# Patient Record
Sex: FEMALE | Race: BLACK OR AFRICAN AMERICAN | Employment: FULL TIME | URBAN - METROPOLITAN AREA
[De-identification: names, ages, dates, MRNs, and addresses within clinical notes are randomized per-mention and may not be internally consistent; named-entity substitution may affect disease eponyms.]

---

## 2018-01-04 ENCOUNTER — HOSPITAL ENCOUNTER (EMERGENCY)
Age: 18
Discharge: HOME | End: 2018-01-05
Attending: EMERGENCY MEDICINE
Payer: SELF-PAY

## 2018-01-04 VITALS
HEIGHT: 63 IN | OXYGEN SATURATION: 100 % | SYSTOLIC BLOOD PRESSURE: 122 MMHG | WEIGHT: 234 LBS | HEART RATE: 58 BPM | TEMPERATURE: 98.4 F | RESPIRATION RATE: 20 BRPM | DIASTOLIC BLOOD PRESSURE: 49 MMHG | BODY MASS INDEX: 41.46 KG/M2

## 2018-01-04 DIAGNOSIS — Z34.91 NORMAL INTRAUTERINE PREGNANCY ON PRENATAL ULTRASOUND IN FIRST TRIMESTER: Primary | ICD-10-CM

## 2018-01-04 LAB
ALBUMIN SERPL-MCNC: 3.7 G/DL (ref 3.5–5)
ALBUMIN/GLOB SERPL: 1 {RATIO} (ref 1.1–2.2)
ALP SERPL-CCNC: 85 U/L (ref 45–117)
ALT SERPL-CCNC: 18 U/L (ref 12–78)
ANION GAP SERPL CALC-SCNC: 10 MMOL/L (ref 5–15)
APPEARANCE UR: CLEAR
AST SERPL-CCNC: 14 U/L (ref 15–37)
BASOPHILS # BLD: 0 K/UL (ref 0–0.1)
BASOPHILS NFR BLD: 0 % (ref 0–1)
BILIRUB SERPL-MCNC: 0.3 MG/DL (ref 0.2–1)
BILIRUB UR QL: NEGATIVE
BUN SERPL-MCNC: 8 MG/DL (ref 6–20)
BUN/CREAT SERPL: 11 (ref 12–20)
CALCIUM SERPL-MCNC: 9.2 MG/DL (ref 8.5–10.1)
CHLORIDE SERPL-SCNC: 103 MMOL/L (ref 97–108)
CO2 SERPL-SCNC: 24 MMOL/L (ref 21–32)
COLOR UR: ABNORMAL
CREAT SERPL-MCNC: 0.73 MG/DL (ref 0.55–1.02)
D DIMER PPP FEU-MCNC: 0.24 MG/L FEU (ref 0–0.65)
EOSINOPHIL # BLD: 0.2 K/UL (ref 0–0.4)
EOSINOPHIL NFR BLD: 1 % (ref 0–7)
ERYTHROCYTE [DISTWIDTH] IN BLOOD BY AUTOMATED COUNT: 12.1 % (ref 11.5–14.5)
GLOBULIN SER CALC-MCNC: 3.7 G/DL (ref 2–4)
GLUCOSE SERPL-MCNC: 93 MG/DL (ref 65–100)
GLUCOSE UR STRIP.AUTO-MCNC: NEGATIVE MG/DL
HCG SERPL-ACNC: ABNORMAL MIU/ML (ref 0–6)
HCT VFR BLD AUTO: 37.8 % (ref 35–47)
HGB BLD-MCNC: 13.1 G/DL (ref 11.5–16)
HGB UR QL STRIP: NEGATIVE
KETONES UR QL STRIP.AUTO: ABNORMAL MG/DL
LEUKOCYTE ESTERASE UR QL STRIP.AUTO: NEGATIVE
LYMPHOCYTES # BLD: 2.7 K/UL (ref 0.8–3.5)
LYMPHOCYTES NFR BLD: 19 % (ref 12–49)
MCH RBC QN AUTO: 29.4 PG (ref 26–34)
MCHC RBC AUTO-ENTMCNC: 34.7 G/DL (ref 30–36.5)
MCV RBC AUTO: 84.9 FL (ref 80–99)
MONOCYTES # BLD: 1 K/UL (ref 0–1)
MONOCYTES NFR BLD: 8 % (ref 5–13)
NEUTS SEG # BLD: 9.8 K/UL (ref 1.8–8)
NEUTS SEG NFR BLD: 72 % (ref 32–75)
NITRITE UR QL STRIP.AUTO: NEGATIVE
PH UR STRIP: 7 [PH] (ref 5–8)
PLATELET # BLD AUTO: 284 K/UL (ref 150–400)
POTASSIUM SERPL-SCNC: 3.7 MMOL/L (ref 3.5–5.1)
PROT SERPL-MCNC: 7.4 G/DL (ref 6.4–8.2)
PROT UR STRIP-MCNC: NEGATIVE MG/DL
RBC # BLD AUTO: 4.45 M/UL (ref 3.8–5.2)
SODIUM SERPL-SCNC: 137 MMOL/L (ref 136–145)
SP GR UR REFRACTOMETRY: 1.02 (ref 1–1.03)
TROPONIN I SERPL-MCNC: <0.04 NG/ML
UROBILINOGEN UR QL STRIP.AUTO: 1 EU/DL (ref 0.2–1)
WBC # BLD AUTO: 13.7 K/UL (ref 3.6–11)

## 2018-01-04 PROCEDURE — 84484 ASSAY OF TROPONIN QUANT: CPT | Performed by: EMERGENCY MEDICINE

## 2018-01-04 PROCEDURE — 80053 COMPREHEN METABOLIC PANEL: CPT | Performed by: EMERGENCY MEDICINE

## 2018-01-04 PROCEDURE — 84702 CHORIONIC GONADOTROPIN TEST: CPT | Performed by: EMERGENCY MEDICINE

## 2018-01-04 PROCEDURE — 85025 COMPLETE CBC W/AUTO DIFF WBC: CPT | Performed by: EMERGENCY MEDICINE

## 2018-01-04 PROCEDURE — 93005 ELECTROCARDIOGRAM TRACING: CPT

## 2018-01-04 PROCEDURE — 36415 COLL VENOUS BLD VENIPUNCTURE: CPT | Performed by: EMERGENCY MEDICINE

## 2018-01-04 PROCEDURE — 85379 FIBRIN DEGRADATION QUANT: CPT | Performed by: EMERGENCY MEDICINE

## 2018-01-04 PROCEDURE — 99284 EMERGENCY DEPT VISIT MOD MDM: CPT

## 2018-01-04 PROCEDURE — 81003 URINALYSIS AUTO W/O SCOPE: CPT | Performed by: EMERGENCY MEDICINE

## 2018-01-05 ENCOUNTER — APPOINTMENT (OUTPATIENT)
Dept: ULTRASOUND IMAGING | Age: 18
End: 2018-01-05
Attending: EMERGENCY MEDICINE
Payer: SELF-PAY

## 2018-01-05 PROCEDURE — 76817 TRANSVAGINAL US OBSTETRIC: CPT

## 2018-01-05 PROCEDURE — 76801 OB US < 14 WKS SINGLE FETUS: CPT

## 2018-01-05 NOTE — ED PROVIDER NOTES
EMERGENCY DEPARTMENT HISTORY AND PHYSICAL EXAM      Date: 1/4/2018  Patient Name: Callie Campbell    History of Presenting Illness     Chief Complaint   Patient presents with    Vaginal Bleeding     Pt with c/o vaginal bleeding that started today, pt is 7 weeks pregnant. Pt also has c/o intermittent CP for the past two days with worsening pain today.  Chest Pain       History Provided By: Patient    HPI: Callie Campbell, 21 y.o. seven week pregnant female presents ambulatory to the ED with cc of vaginal bleeding and associated moderate vaginal/adbominal cramping onset today. She states she was confirmed pregnant by her PCP and is expected to f/u at 10 weeks. Pt also reports having increasing intermittent moderate to severe CP x three days. She denies any medical evaluation for the CP. Pt denies any hx of similar CP or cardiac disease. She notes quitting smoking after finding out she was pregnant. Pt specifically denies any fevers, chills, sore throat, rhinorrhea, SOB, nausea, vomiting, HA, dizziness, and rashes. PMHx: None pertinent to HPI. Social hx: -(former)Tobacco, -EtOH, -Drugs    PCP: Edgar Kamara MD    There are no other complaints, changes, or physical findings at this time. Past History     Past Medical History:  Past Medical History:   Diagnosis Date    Other ill-defined conditions(782.44)     eczema, ADHD    Psychiatric disorder        Past Surgical History:  History reviewed. No pertinent surgical history. Family History:  History reviewed. No pertinent family history. Social History:  Social History   Substance Use Topics    Smoking status: Never Smoker    Smokeless tobacco: None    Alcohol use No       Allergies:  No Known Allergies      Review of Systems   Review of Systems   Constitutional: Negative for chills and fever. HENT: Negative for congestion, rhinorrhea, sneezing and sore throat. Eyes: Negative for redness and visual disturbance. Respiratory: Positive for chest tightness. Negative for shortness of breath. Cardiovascular: Positive for chest pain. Negative for leg swelling. Gastrointestinal: Positive for abdominal pain. Negative for nausea and vomiting. Genitourinary: Positive for vaginal bleeding and vaginal pain. Negative for difficulty urinating and frequency. Musculoskeletal: Negative for back pain, myalgias and neck pain. Skin: Negative for rash. Neurological: Negative for dizziness, syncope, weakness and headaches. Hematological: Negative for adenopathy. All other systems reviewed and are negative. Physical Exam   Physical Exam   Constitutional: She is oriented to person, place, and time. She appears well-developed and well-nourished. HENT:   Head: Normocephalic. Mouth/Throat: Oropharynx is clear and moist.   Eyes: Conjunctivae and EOM are normal. Pupils are equal, round, and reactive to light. Neck: Normal range of motion. Neck supple. Cardiovascular: Normal rate, regular rhythm, normal heart sounds and intact distal pulses. Pulmonary/Chest: Effort normal and breath sounds normal.   Abdominal: Soft. Bowel sounds are normal. She exhibits no distension. There is no rebound. Genitourinary:   Genitourinary Comments: Cervical os closed. No vaginal discharge. No lesions. Musculoskeletal: Normal range of motion. She exhibits no edema or deformity. Neurological: She is alert and oriented to person, place, and time. Skin: Skin is warm and dry. Psychiatric: She has a normal mood and affect.  Her behavior is normal. Judgment and thought content normal.         Diagnostic Study Results     Labs -     Recent Results (from the past 12 hour(s))   TROPONIN I    Collection Time: 01/04/18  9:47 PM   Result Value Ref Range    Troponin-I, Qt. <0.04 <0.05 ng/mL   CBC WITH AUTOMATED DIFF    Collection Time: 01/04/18  9:54 PM   Result Value Ref Range    WBC 13.7 (H) 3.6 - 11.0 K/uL    RBC 4.45 3.80 - 5.20 M/uL HGB 13.1 11.5 - 16.0 g/dL    HCT 37.8 35.0 - 47.0 %    MCV 84.9 80.0 - 99.0 FL    MCH 29.4 26.0 - 34.0 PG    MCHC 34.7 30.0 - 36.5 g/dL    RDW 12.1 11.5 - 14.5 %    PLATELET 703 266 - 540 K/uL    NEUTROPHILS 72 32 - 75 %    LYMPHOCYTES 19 12 - 49 %    MONOCYTES 8 5 - 13 %    EOSINOPHILS 1 0 - 7 %    BASOPHILS 0 0 - 1 %    ABS. NEUTROPHILS 9.8 (H) 1.8 - 8.0 K/UL    ABS. LYMPHOCYTES 2.7 0.8 - 3.5 K/UL    ABS. MONOCYTES 1.0 0.0 - 1.0 K/UL    ABS. EOSINOPHILS 0.2 0.0 - 0.4 K/UL    ABS. BASOPHILS 0.0 0.0 - 0.1 K/UL   D DIMER    Collection Time: 01/04/18  9:54 PM   Result Value Ref Range    D-dimer 0.24 0.00 - 0.65 mg/L FEU   METABOLIC PANEL, COMPREHENSIVE    Collection Time: 01/04/18  9:54 PM   Result Value Ref Range    Sodium 137 136 - 145 mmol/L    Potassium 3.7 3.5 - 5.1 mmol/L    Chloride 103 97 - 108 mmol/L    CO2 24 21 - 32 mmol/L    Anion gap 10 5 - 15 mmol/L    Glucose 93 65 - 100 mg/dL    BUN 8 6 - 20 MG/DL    Creatinine 0.73 0.55 - 1.02 MG/DL    BUN/Creatinine ratio 11 (L) 12 - 20      GFR est AA >60 >60 ml/min/1.73m2    GFR est non-AA >60 >60 ml/min/1.73m2    Calcium 9.2 8.5 - 10.1 MG/DL    Bilirubin, total 0.3 0.2 - 1.0 MG/DL    ALT (SGPT) 18 12 - 78 U/L    AST (SGOT) 14 (L) 15 - 37 U/L    Alk.  phosphatase 85 45 - 117 U/L    Protein, total 7.4 6.4 - 8.2 g/dL    Albumin 3.7 3.5 - 5.0 g/dL    Globulin 3.7 2.0 - 4.0 g/dL    A-G Ratio 1.0 (L) 1.1 - 2.2     BETA HCG, QT    Collection Time: 01/04/18  9:54 PM   Result Value Ref [place] [address] (H) 0 - 6 MIU/ML   URINALYSIS W/ RFLX MICROSCOPIC    Collection Time: 01/04/18  9:54 PM   Result Value Ref Range    Color YELLOW/STRAW      Appearance CLEAR CLEAR      Specific gravity 1.020 1.003 - 1.030      pH (UA) 7.0 5.0 - 8.0      Protein NEGATIVE  NEG mg/dL    Glucose NEGATIVE  NEG mg/dL    Ketone TRACE (A) NEG mg/dL    Bilirubin NEGATIVE  NEG      Blood NEGATIVE  NEG      Urobilinogen 1.0 0.2 - 1.0 EU/dL    Nitrites NEGATIVE  NEG      Leukocyte Esterase NEGATIVE  NEG     EKG, 12 LEAD, INITIAL    Collection Time: 01/04/18 10:09 PM   Result Value Ref Range    Ventricular Rate 59 BPM    Atrial Rate 59 BPM    P-R Interval 158 ms    QRS Duration 96 ms    Q-T Interval 400 ms    QTC Calculation (Bezet) 396 ms    Calculated P Axis 42 degrees    Calculated R Axis 79 degrees    Calculated T Axis 43 degrees    Diagnosis       Sinus bradycardia with sinus arrhythmia  Otherwise normal ECG  No previous ECGs available         Radiologic Studies -   Study Result      INDICATION:  Vaginal bleeding, after first trimester, no other signs/symp      OBSTETRIC PELVIC SONOGRAM  is performed through the unremarkable urinary  bladder.     The uterus contains a single early fetal pole with heart rate 121 bpm. Uterus  measures approximately 9 x 6 x 5 cm, with no apparent fibroid. The placenta and  amniotic fluid volume cannot be evaluated due to early gestational age. Gestational sac is smooth.         Ovaries are obscured by bowel gas. No adnexal mass or dominant cyst is seen. There is no significant free fluid in the pelvic cul-de-sac.     OBSTETRIC TRANSVAGINAL SONOGRAM  is performed following Pelvic Sonogram  in  order to more accurately measure the early IUP and to more adequately delineate  ovarian anatomy.     Crown-rump length measures 7 mm corresponding with 6 week 4 day age.      Left ovary remains obscured. Right ovary is normal in appearance and  vascularity, measuring 2.9 x 2.8 x 2.5 cm.     IMPRESSION  IMPRESSION: 6 week 4 day living IUP. Unremarkable adnexa. No free fluid. Medical Decision Making   I am the first provider for this patient. I reviewed the vital signs, available nursing notes, past medical history, past surgical history, family history and social history. Vital Signs-Reviewed the patient's vital signs.   Patient Vitals for the past 12 hrs:   Temp Pulse Resp BP SpO2   01/04/18 2145 98.4 °F (36.9 °C) (!) 58 20 122/49 100 %       Records Reviewed: Old Medical Records    Provider Notes (Medical Decision Making):   DDx: arrhythmia, PE, threatened miscarriage, UTI    ED Course:   Initial assessment performed. The patients presenting problems have been discussed, and they are in agreement with the care plan formulated and outlined with them. I have encouraged them to ask questions as they arise throughout their visit. Procedure Note - Pelvic Exam:    11:23 PM  Performed by: Cr Yoder MD  Chaperoned by: Librado Hardy RN  Pelvic exam was performed using bimanual and speculum. Further findings noted in physical exam.   The procedure took 1-15 minutes, and pt tolerated well. SIGN OUT:  12:05 AM  Patient's presentation, labs/imaging and plan of care was reviewed with Tara Irwin MD as part of sign out. They will review pt's US results, treat and dispo accordingly as part of the plan discussed with the patient. Tara Irwin MD's assistance in completion of this plan is greatly appreciated but it should be noted that I will be the provider of record for this patient. Cr Yoder MD    Critical Care Time:   None    Disposition:  DISCHARGE NOTE  1:52 AM  The patient has been re-evaluated and is ready for discharge. Reviewed available results with patient. Counseled pt on diagnosis and care plan. Pt has expressed understanding, and all questions have been answered. Pt agrees with plan and agrees to follow up as recommended, or return to the ED if their symptoms worsen. Discharge instructions have been provided and explained to the pt, along with reasons to return to the ED. PLAN:  1. There are no discharge medications for this patient.     2.   Follow-up Information     Follow up With Details Comments Contact Info    [facility] EMERGENCY DEPT  As needed, If symptoms worsen [address] [facility]    Alyssa Camp MD [facility] Address: [address] [facility], [address]  Phone: [phone] [address]  [phone]    [facility]   [address]   [place], [address] [address]  [address] [facility]   [address]  [address] [address] [phone]    Verito Ferrera, [address]  [phone]          Return to ED if worse     Diagnosis     Clinical Impression:   1. Normal intrauterine pregnancy on prenatal ultrasound in first trimester        Attestations: This note is prepared by Michael Wright, acting as Scribe for Audrey George MD.    Audrey George MD: The scribe's documentation has been prepared under my direction and personally reviewed by me in its entirety. I confirm that the note above accurately reflects all work, treatment, procedures, and medical decision making performed by me.

## 2018-01-05 NOTE — ED NOTES
Pt noted to be resting comfortably, given water to drink. Pt updated on plan of care, has no questions or concerns at this time.

## 2018-01-05 NOTE — DISCHARGE INSTRUCTIONS
Threatened Miscarriage: Care Instructions  Your Care Instructions    Some women have light spotting or bleeding during the first 12 weeks of pregnancy. In some cases this is normal. Light spotting or bleeding can also be a sign of a possible loss of the pregnancy. This is called a threatened miscarriage. At this point, the doctor may not be able to tell if your vaginal bleeding is normal or is a sign of a miscarriage. In early pregnancy, things such as stress, exercise, and sex do not cause miscarriage. You may be worried or upset about the possibility of losing your pregnancy. But do not blame yourself. There is no treatment to stop a threatened miscarriage. If you do have a miscarriage, there was nothing you could have done to prevent it. A miscarriage usually means that the pregnancy is not developing normally. The doctor has checked you carefully, but problems can develop later. If you notice any problems or new symptoms, get medical treatment right away. Follow-up care is a key part of your treatment and safety. Be sure to make and go to all appointments, and call your doctor if you are having problems. It's also a good idea to know your test results and keep a list of the medicines you take. How can you care for yourself at home? · If you do have a miscarriage, you will probably have some vaginal bleeding for 1 to 2 weeks. Use pads instead of tampons. · Take acetaminophen (Tylenol) for cramps. Read and follow all instructions on the label. · Do not take two or more pain medicines at the same time unless the doctor told you to. Many pain medicines have acetaminophen, which is Tylenol. Too much acetaminophen (Tylenol) can be harmful. · Do not have sex until your doctor says it is okay. · Get lots of rest over the next several days. · You may do your normal activities if you feel well enough to do them. But do not do any heavy exercise until your doctor says it is okay.   · Eat a balanced diet that is high in iron and vitamin C. Foods rich in iron include red meat, shellfish, eggs, beans, and leafy green vegetables. Foods high in vitamin C include citrus fruits, tomatoes, and broccoli. Talk to your doctor about whether you need to take iron pills or a multivitamin. · Do not drink alcohol or use tobacco or illegal drugs. · Do not smoke. If you need help quitting, talk to your doctor about stop-smoking programs and medicines. These can increase your chances of quitting for good. When should you call for help? Call 911 anytime you think you may need emergency care. For example, call if:  ? · You passed out (lost consciousness). ?Call your doctor now or seek immediate medical care if:  ? · You have severe vaginal bleeding. ? · You are dizzy or lightheaded, or you feel like you may faint. ? · You have new or worse pain in your belly or pelvis. ? · You have a fever. ? · You have vaginal discharge that smells bad. ? Watch closely for changes in your health, and be sure to contact your doctor if:  ? · You do not get better as expected. Where can you learn more? Go to http://jonathan-lara.info/. Enter P736 in the search box to learn more about \"Threatened Miscarriage: Care Instructions. \"  Current as of: March 16, 2017  Content Version: 11.4  © 5600-3529 [employer]. Care instructions adapted under license by [employer] (which disclaims liability or warranty for this information). If you have questions about a medical condition or this instruction, always ask your healthcare professional. [place] [address] any warranty or liability for your use of this information.

## 2018-01-05 NOTE — ED NOTES
Pt presents ambulatory to ED complaining of vaginal bleeding that started today. Pt reports she is 7 weeks pregnant, states she has not yet set up any prenatal care because she no longer has insurance. Pt given handouts for prenatal classes offered at [facility]. Pt also reports pain to the center of her chest x 2 days, states she has not tried any medication for this. Pt is alert and oriented x 4, RR even and unlabored, skin is warm and dry. Assesment completed and pt updated on plan of care. Emergency Department Nursing Plan of Care       The Nursing Plan of Care is developed from the Nursing assessment and Emergency Department Attending provider initial evaluation. The plan of care may be reviewed in the ED Provider note.     The Plan of Care was developed with the following considerations:   Patient / Family readiness to learn indicated by:verbalized understanding  Persons(s) to be included in education: patient  Barriers to Learning/Limitations:No    Signed     Ronda Arteaga RN    1/4/2018   10:15 PM

## 2018-01-05 NOTE — ED NOTES
Pt noted to be resting comfortably with male visitor at bedside. Pt updated on plan of care, has no questions or concerns at this time.

## 2018-01-05 NOTE — ED NOTES
Discharge instructions were given to the patient by Violet Siu RN. The patient left the Emergency Department ambulatory, alert and oriented and in no acute distress with 0 prescriptions. The patient was encouraged to call or return to the ED for worsening issues or problems and was encouraged to schedule a follow up appointment for continuing care. The patient verbalized understanding of discharge instructions and prescriptions, all questions were answered. The patient has no further concerns at this time.

## 2018-01-06 LAB
ATRIAL RATE: 59 BPM
CALCULATED P AXIS, ECG09: 42 DEGREES
CALCULATED R AXIS, ECG10: 79 DEGREES
CALCULATED T AXIS, ECG11: 43 DEGREES
DIAGNOSIS, 93000: NORMAL
P-R INTERVAL, ECG05: 158 MS
Q-T INTERVAL, ECG07: 400 MS
QRS DURATION, ECG06: 96 MS
QTC CALCULATION (BEZET), ECG08: 396 MS
VENTRICULAR RATE, ECG03: 59 BPM

## 2018-03-22 LAB
ANTIBODY SCREEN, EXTERNAL: NEGATIVE
CHLAMYDIA, EXTERNAL: NEGATIVE
HBSAG, EXTERNAL: NEGATIVE
HIV, EXTERNAL: NEGATIVE
N. GONORRHEA, EXTERNAL: NEGATIVE
RPR, EXTERNAL: NON REACTIVE
RUBELLA, EXTERNAL: NORMAL
TYPE, ABO & RH, EXTERNAL: NORMAL

## 2018-04-26 ENCOUNTER — HOSPITAL ENCOUNTER (OUTPATIENT)
Dept: PERINATAL CARE | Age: 18
Discharge: HOME | End: 2018-04-26
Attending: OBSTETRICS & GYNECOLOGY
Payer: MEDICAID

## 2018-04-26 PROCEDURE — 76811 OB US DETAILED SNGL FETUS: CPT | Performed by: OBSTETRICS & GYNECOLOGY

## 2018-08-03 LAB — GRBS, EXTERNAL: POSITIVE

## 2018-08-30 ENCOUNTER — ANESTHESIA EVENT (OUTPATIENT)
Dept: LABOR AND DELIVERY | Age: 18
DRG: 540 | End: 2018-08-30
Payer: MEDICAID

## 2018-08-30 ENCOUNTER — ANESTHESIA (OUTPATIENT)
Dept: LABOR AND DELIVERY | Age: 18
DRG: 540 | End: 2018-08-30
Payer: MEDICAID

## 2018-08-30 ENCOUNTER — HOSPITAL ENCOUNTER (INPATIENT)
Age: 18
LOS: 3 days | Discharge: HOME | DRG: 540 | End: 2018-09-02
Attending: OBSTETRICS & GYNECOLOGY | Admitting: OBSTETRICS & GYNECOLOGY
Payer: MEDICAID

## 2018-08-30 PROBLEM — O26.893 ABDOMINAL PAIN DURING PREGNANCY, THIRD TRIMESTER: Status: ACTIVE | Noted: 2018-08-30

## 2018-08-30 PROBLEM — R10.9 ABDOMINAL PAIN DURING PREGNANCY, THIRD TRIMESTER: Status: ACTIVE | Noted: 2018-08-30

## 2018-08-30 LAB
ABO + RH BLD: NORMAL
BLOOD GROUP ANTIBODIES SERPL: NORMAL
ERYTHROCYTE [DISTWIDTH] IN BLOOD BY AUTOMATED COUNT: 12.6 % (ref 11.5–14.5)
HCT VFR BLD AUTO: 34.2 % (ref 35–47)
HGB BLD-MCNC: 11.3 G/DL (ref 11.5–16)
MCH RBC QN AUTO: 29.4 PG (ref 26–34)
MCHC RBC AUTO-ENTMCNC: 33 G/DL (ref 30–36.5)
MCV RBC AUTO: 88.8 FL (ref 80–99)
NRBC # BLD: 0 K/UL (ref 0–0.01)
NRBC BLD-RTO: 0 PER 100 WBC
PLATELET # BLD AUTO: 217 K/UL (ref 150–400)
PMV BLD AUTO: 11.1 FL (ref 8.9–12.9)
RBC # BLD AUTO: 3.85 M/UL (ref 3.8–5.2)
SPECIMEN EXP DATE BLD: NORMAL
WBC # BLD AUTO: 13.7 K/UL (ref 3.6–11)

## 2018-08-30 PROCEDURE — 74011000250 HC RX REV CODE- 250: Performed by: ANESTHESIOLOGY

## 2018-08-30 PROCEDURE — 65270000029 HC RM PRIVATE

## 2018-08-30 PROCEDURE — 74011250636 HC RX REV CODE- 250/636: Performed by: ANESTHESIOLOGY

## 2018-08-30 PROCEDURE — 76010000391 HC C SECN FIRST 1 HR: Performed by: OBSTETRICS & GYNECOLOGY

## 2018-08-30 PROCEDURE — 74011250636 HC RX REV CODE- 250/636: Performed by: OBSTETRICS & GYNECOLOGY

## 2018-08-30 PROCEDURE — 74011000258 HC RX REV CODE- 258: Performed by: OBSTETRICS & GYNECOLOGY

## 2018-08-30 PROCEDURE — 77030011943

## 2018-08-30 PROCEDURE — 77030018836 HC SOL IRR NACL ICUM -A

## 2018-08-30 PROCEDURE — A4300 CATH IMPL VASC ACCESS PORTAL: HCPCS

## 2018-08-30 PROCEDURE — 86900 BLOOD TYPING SEROLOGIC ABO: CPT | Performed by: OBSTETRICS & GYNECOLOGY

## 2018-08-30 PROCEDURE — 77030011220 HC DEV ELECSURG COVD -B

## 2018-08-30 PROCEDURE — 74011250636 HC RX REV CODE- 250/636

## 2018-08-30 PROCEDURE — 77030032060 HC PWDR HEMSTAT ARISTA ASRB 3GM BARD -C

## 2018-08-30 PROCEDURE — 77030018846 HC SOL IRR STRL H20 ICUM -A

## 2018-08-30 PROCEDURE — 74011000250 HC RX REV CODE- 250

## 2018-08-30 PROCEDURE — 75410000003 HC RECOV DEL/VAG/CSECN EA 0.5 HR: Performed by: OBSTETRICS & GYNECOLOGY

## 2018-08-30 PROCEDURE — 36415 COLL VENOUS BLD VENIPUNCTURE: CPT | Performed by: OBSTETRICS & GYNECOLOGY

## 2018-08-30 PROCEDURE — 75410000002 HC LABOR FEE PER 1 HR

## 2018-08-30 PROCEDURE — 99285 EMERGENCY DEPT VISIT HI MDM: CPT

## 2018-08-30 PROCEDURE — 77030032490 HC SLV COMPR SCD KNE COVD -B

## 2018-08-30 PROCEDURE — 85027 COMPLETE CBC AUTOMATED: CPT | Performed by: OBSTETRICS & GYNECOLOGY

## 2018-08-30 PROCEDURE — 77030012935 HC DRSG AQUACEL BMS -B

## 2018-08-30 PROCEDURE — 76060000078 HC EPIDURAL ANESTHESIA: Performed by: OBSTETRICS & GYNECOLOGY

## 2018-08-30 RX ORDER — KETOROLAC TROMETHAMINE 30 MG/ML
30 INJECTION, SOLUTION INTRAMUSCULAR; INTRAVENOUS
Status: DISPENSED | OUTPATIENT
Start: 2018-08-30 | End: 2018-08-31

## 2018-08-30 RX ORDER — MORPHINE SULFATE 10 MG/ML
10 INJECTION, SOLUTION INTRAMUSCULAR; INTRAVENOUS
Status: DISCONTINUED | OUTPATIENT
Start: 2018-08-30 | End: 2018-09-02 | Stop reason: HOSPADM

## 2018-08-30 RX ORDER — SIMETHICONE 80 MG
80 TABLET,CHEWABLE ORAL
Status: DISCONTINUED | OUTPATIENT
Start: 2018-08-30 | End: 2018-09-02 | Stop reason: HOSPADM

## 2018-08-30 RX ORDER — OXYTOCIN IN 5 % DEXTROSE 30/500 ML
1-25 PLASTIC BAG, INJECTION (ML) INTRAVENOUS
Status: DISCONTINUED | OUTPATIENT
Start: 2018-08-30 | End: 2018-09-02 | Stop reason: HOSPADM

## 2018-08-30 RX ORDER — NALBUPHINE HYDROCHLORIDE 10 MG/ML
5 INJECTION, SOLUTION INTRAMUSCULAR; INTRAVENOUS; SUBCUTANEOUS
Status: DISCONTINUED | OUTPATIENT
Start: 2018-08-30 | End: 2018-09-02 | Stop reason: HOSPADM

## 2018-08-30 RX ORDER — SODIUM CHLORIDE, SODIUM LACTATE, POTASSIUM CHLORIDE, CALCIUM CHLORIDE 600; 310; 30; 20 MG/100ML; MG/100ML; MG/100ML; MG/100ML
1000 INJECTION, SOLUTION INTRAVENOUS CONTINUOUS
Status: DISCONTINUED | OUTPATIENT
Start: 2018-08-30 | End: 2018-08-30 | Stop reason: HOSPADM

## 2018-08-30 RX ORDER — MORPHINE SULFATE 0.5 MG/ML
INJECTION, SOLUTION EPIDURAL; INTRATHECAL; INTRAVENOUS AS NEEDED
Status: DISCONTINUED | OUTPATIENT
Start: 2018-08-30 | End: 2018-08-30 | Stop reason: HOSPADM

## 2018-08-30 RX ORDER — SODIUM CHLORIDE 0.9 % (FLUSH) 0.9 %
5-10 SYRINGE (ML) INJECTION EVERY 8 HOURS
Status: DISCONTINUED | OUTPATIENT
Start: 2018-08-30 | End: 2018-09-02 | Stop reason: HOSPADM

## 2018-08-30 RX ORDER — SODIUM CHLORIDE, SODIUM LACTATE, POTASSIUM CHLORIDE, CALCIUM CHLORIDE 600; 310; 30; 20 MG/100ML; MG/100ML; MG/100ML; MG/100ML
125 INJECTION, SOLUTION INTRAVENOUS CONTINUOUS
Status: DISCONTINUED | OUTPATIENT
Start: 2018-08-30 | End: 2018-09-02 | Stop reason: HOSPADM

## 2018-08-30 RX ORDER — FENTANYL/BUPIVACAINE/NS/PF 2-1250MCG
1-16 PREFILLED PUMP RESERVOIR EPIDURAL CONTINUOUS
Status: DISCONTINUED | OUTPATIENT
Start: 2018-08-30 | End: 2018-08-30 | Stop reason: HOSPADM

## 2018-08-30 RX ORDER — NALOXONE HYDROCHLORIDE 0.4 MG/ML
0.4 INJECTION, SOLUTION INTRAMUSCULAR; INTRAVENOUS; SUBCUTANEOUS AS NEEDED
Status: DISCONTINUED | OUTPATIENT
Start: 2018-08-30 | End: 2018-09-02 | Stop reason: HOSPADM

## 2018-08-30 RX ORDER — CEFAZOLIN SODIUM/WATER 2 G/20 ML
2 SYRINGE (ML) INTRAVENOUS ONCE
Status: COMPLETED | OUTPATIENT
Start: 2018-08-30 | End: 2018-08-30

## 2018-08-30 RX ORDER — TERBUTALINE SULFATE 1 MG/ML
0.25 INJECTION SUBCUTANEOUS AS NEEDED
Status: DISCONTINUED | OUTPATIENT
Start: 2018-08-30 | End: 2018-08-30 | Stop reason: HOSPADM

## 2018-08-30 RX ORDER — IBUPROFEN 400 MG/1
800 TABLET ORAL EVERY 8 HOURS
Status: DISCONTINUED | OUTPATIENT
Start: 2018-08-30 | End: 2018-09-02 | Stop reason: HOSPADM

## 2018-08-30 RX ORDER — BUPIVACAINE HYDROCHLORIDE 2.5 MG/ML
INJECTION, SOLUTION EPIDURAL; INFILTRATION; INTRACAUDAL
Status: DISPENSED
Start: 2018-08-30 | End: 2018-08-31

## 2018-08-30 RX ORDER — HYDROCORTISONE 1 %
CREAM (GRAM) TOPICAL AS NEEDED
Status: DISCONTINUED | OUTPATIENT
Start: 2018-08-30 | End: 2018-09-02 | Stop reason: HOSPADM

## 2018-08-30 RX ORDER — ONDANSETRON 2 MG/ML
INJECTION INTRAMUSCULAR; INTRAVENOUS AS NEEDED
Status: DISCONTINUED | OUTPATIENT
Start: 2018-08-30 | End: 2018-08-30 | Stop reason: HOSPADM

## 2018-08-30 RX ORDER — ONDANSETRON 2 MG/ML
4 INJECTION INTRAMUSCULAR; INTRAVENOUS
Status: ACTIVE | OUTPATIENT
Start: 2018-08-30 | End: 2018-08-31

## 2018-08-30 RX ORDER — LIDOCAINE HYDROCHLORIDE AND EPINEPHRINE 20; 5 MG/ML; UG/ML
INJECTION, SOLUTION EPIDURAL; INFILTRATION; INTRACAUDAL; PERINEURAL
Status: COMPLETED
Start: 2018-08-30 | End: 2018-08-30

## 2018-08-30 RX ORDER — SODIUM CHLORIDE 0.9 % (FLUSH) 0.9 %
5-10 SYRINGE (ML) INJECTION EVERY 8 HOURS
Status: DISCONTINUED | OUTPATIENT
Start: 2018-08-30 | End: 2018-08-30 | Stop reason: HOSPADM

## 2018-08-30 RX ORDER — EPHEDRINE SULFATE 50 MG/ML
10 INJECTION, SOLUTION INTRAVENOUS
Status: COMPLETED | OUTPATIENT
Start: 2018-08-30 | End: 2018-08-30

## 2018-08-30 RX ORDER — BUPIVACAINE HYDROCHLORIDE 2.5 MG/ML
10 INJECTION, SOLUTION EPIDURAL; INFILTRATION; INTRACAUDAL ONCE
Status: DISCONTINUED | OUTPATIENT
Start: 2018-08-30 | End: 2018-08-30 | Stop reason: HOSPADM

## 2018-08-30 RX ORDER — SODIUM CHLORIDE 0.9 % (FLUSH) 0.9 %
5-10 SYRINGE (ML) INJECTION AS NEEDED
Status: DISCONTINUED | OUTPATIENT
Start: 2018-08-30 | End: 2018-09-02 | Stop reason: HOSPADM

## 2018-08-30 RX ORDER — SODIUM CHLORIDE 0.9 % (FLUSH) 0.9 %
5-10 SYRINGE (ML) INJECTION AS NEEDED
Status: DISCONTINUED | OUTPATIENT
Start: 2018-08-30 | End: 2018-08-30 | Stop reason: HOSPADM

## 2018-08-30 RX ORDER — OXYTOCIN/RINGER'S LACTATE 20/1000 ML
PLASTIC BAG, INJECTION (ML) INTRAVENOUS
Status: COMPLETED
Start: 2018-08-30 | End: 2018-08-30

## 2018-08-30 RX ORDER — AMMONIA 15 % (W/V)
1 AMPUL (EA) INHALATION AS NEEDED
Status: DISCONTINUED | OUTPATIENT
Start: 2018-08-30 | End: 2018-09-02 | Stop reason: HOSPADM

## 2018-08-30 RX ORDER — HYDROMORPHONE HYDROCHLORIDE 2 MG/1
2 TABLET ORAL
Status: DISCONTINUED | OUTPATIENT
Start: 2018-08-30 | End: 2018-09-02 | Stop reason: HOSPADM

## 2018-08-30 RX ORDER — FENTANYL CITRATE 50 UG/ML
100 INJECTION, SOLUTION INTRAMUSCULAR; INTRAVENOUS ONCE
Status: DISCONTINUED | OUTPATIENT
Start: 2018-08-30 | End: 2018-08-30 | Stop reason: HOSPADM

## 2018-08-30 RX ORDER — DOCUSATE SODIUM 100 MG/1
100 CAPSULE, LIQUID FILLED ORAL
Status: DISCONTINUED | OUTPATIENT
Start: 2018-08-30 | End: 2018-09-02 | Stop reason: HOSPADM

## 2018-08-30 RX ORDER — OXYTOCIN/RINGER'S LACTATE 20/1000 ML
125-1000 PLASTIC BAG, INJECTION (ML) INTRAVENOUS AS NEEDED
Status: DISCONTINUED | OUTPATIENT
Start: 2018-08-30 | End: 2018-09-02 | Stop reason: HOSPADM

## 2018-08-30 RX ORDER — NALOXONE HYDROCHLORIDE 0.4 MG/ML
0.4 INJECTION, SOLUTION INTRAMUSCULAR; INTRAVENOUS; SUBCUTANEOUS AS NEEDED
Status: DISCONTINUED | OUTPATIENT
Start: 2018-08-30 | End: 2018-08-30 | Stop reason: HOSPADM

## 2018-08-30 RX ORDER — OXYTOCIN/RINGER'S LACTATE 20/1000 ML
PLASTIC BAG, INJECTION (ML) INTRAVENOUS
Status: DISCONTINUED | OUTPATIENT
Start: 2018-08-30 | End: 2018-08-30 | Stop reason: HOSPADM

## 2018-08-30 RX ORDER — OXYTOCIN IN 5 % DEXTROSE 30/500 ML
1-25 PLASTIC BAG, INJECTION (ML) INTRAVENOUS
Status: DISCONTINUED | OUTPATIENT
Start: 2018-08-30 | End: 2018-08-30 | Stop reason: HOSPADM

## 2018-08-30 RX ORDER — PHENYLEPHRINE 10 MG/250 ML(40 MCG/ML)IN 0.9 % SOD.CHLORIDE INTRAVENOUS
Status: DISPENSED
Start: 2018-08-30 | End: 2018-08-31

## 2018-08-30 RX ORDER — CEFAZOLIN SODIUM/WATER 2 G/20 ML
SYRINGE (ML) INTRAVENOUS
Status: COMPLETED
Start: 2018-08-30 | End: 2018-08-30

## 2018-08-30 RX ORDER — LIDOCAINE HYDROCHLORIDE AND EPINEPHRINE 20; 5 MG/ML; UG/ML
INJECTION, SOLUTION EPIDURAL; INFILTRATION; INTRACAUDAL; PERINEURAL AS NEEDED
Status: DISCONTINUED | OUTPATIENT
Start: 2018-08-30 | End: 2018-08-30 | Stop reason: HOSPADM

## 2018-08-30 RX ORDER — ONDANSETRON 2 MG/ML
4 INJECTION INTRAMUSCULAR; INTRAVENOUS
Status: DISCONTINUED | OUTPATIENT
Start: 2018-08-30 | End: 2018-08-30 | Stop reason: HOSPADM

## 2018-08-30 RX ORDER — NALBUPHINE HYDROCHLORIDE 10 MG/ML
10 INJECTION, SOLUTION INTRAMUSCULAR; INTRAVENOUS; SUBCUTANEOUS
Status: COMPLETED | OUTPATIENT
Start: 2018-08-30 | End: 2018-08-30

## 2018-08-30 RX ORDER — NALBUPHINE HYDROCHLORIDE 10 MG/ML
2.5 INJECTION, SOLUTION INTRAMUSCULAR; INTRAVENOUS; SUBCUTANEOUS
Status: ACTIVE | OUTPATIENT
Start: 2018-08-30 | End: 2018-08-31

## 2018-08-30 RX ADMIN — ONDANSETRON 4 MG: 2 INJECTION INTRAMUSCULAR; INTRAVENOUS at 21:03

## 2018-08-30 RX ADMIN — LIDOCAINE HYDROCHLORIDE AND EPINEPHRINE 12 ML: 20; 5 INJECTION, SOLUTION EPIDURAL; INFILTRATION; INTRACAUDAL; PERINEURAL at 20:06

## 2018-08-30 RX ADMIN — Medication 10 ML/HR: at 16:53

## 2018-08-30 RX ADMIN — SODIUM CHLORIDE, SODIUM LACTATE, POTASSIUM CHLORIDE, AND CALCIUM CHLORIDE 125 ML/HR: 600; 310; 30; 20 INJECTION, SOLUTION INTRAVENOUS at 06:03

## 2018-08-30 RX ADMIN — PENICILLIN G POTASSIUM 2.5 MILLION UNITS: 20000000 POWDER, FOR SOLUTION INTRAVENOUS at 10:28

## 2018-08-30 RX ADMIN — Medication 2 G: at 20:19

## 2018-08-30 RX ADMIN — NALBUPHINE HYDROCHLORIDE 10 MG: 10 INJECTION, SOLUTION INTRAMUSCULAR; INTRAVENOUS; SUBCUTANEOUS at 06:23

## 2018-08-30 RX ADMIN — PENICILLIN G POTASSIUM 2.5 MILLION UNITS: 20000000 POWDER, FOR SOLUTION INTRAVENOUS at 14:08

## 2018-08-30 RX ADMIN — SODIUM CHLORIDE, SODIUM LACTATE, POTASSIUM CHLORIDE, AND CALCIUM CHLORIDE 125 ML/HR: 600; 310; 30; 20 INJECTION, SOLUTION INTRAVENOUS at 23:15

## 2018-08-30 RX ADMIN — EPHEDRINE SULFATE 10 MG: 50 INJECTION, SOLUTION INTRAVENOUS at 17:28

## 2018-08-30 RX ADMIN — Medication 2 MILLI-UNITS/MIN: at 12:00

## 2018-08-30 RX ADMIN — MORPHINE SULFATE 4 MG: 0.5 INJECTION, SOLUTION EPIDURAL; INTRATHECAL; INTRAVENOUS at 21:12

## 2018-08-30 RX ADMIN — Medication: at 20:43

## 2018-08-30 RX ADMIN — Medication 10 MILLI-UNITS/MIN: at 15:00

## 2018-08-30 RX ADMIN — PENICILLIN G POTASSIUM 2.5 MILLION UNITS: 20000000 POWDER, FOR SOLUTION INTRAVENOUS at 18:13

## 2018-08-30 RX ADMIN — NALBUPHINE HYDROCHLORIDE 10 MG: 10 INJECTION, SOLUTION INTRAMUSCULAR; INTRAVENOUS; SUBCUTANEOUS at 13:20

## 2018-08-30 RX ADMIN — SODIUM CHLORIDE, SODIUM LACTATE, POTASSIUM CHLORIDE, AND CALCIUM CHLORIDE 500 ML: 600; 310; 30; 20 INJECTION, SOLUTION INTRAVENOUS at 17:23

## 2018-08-30 RX ADMIN — SODIUM CHLORIDE 5 MILLION UNITS: 900 INJECTION, SOLUTION INTRAVENOUS at 06:02

## 2018-08-30 RX ADMIN — KETOROLAC TROMETHAMINE 30 MG: 30 INJECTION, SOLUTION INTRAMUSCULAR; INTRAVENOUS at 23:43

## 2018-08-30 NOTE — PROGRESS NOTES
Labor Progress Note Patient seen, fetal heart rate and contraction pattern evaluated, patient examined. Pt had nubain a couple hours ago for pain mgmt. No data found. Physical Exam: 
Cervical Exam:  4/80 %/-2/  
Uterine Activity: Frequency: Every 4 minutes and Intensity: moderate Fetal Heart Rate: Reactive Baseline: 140 per minute Variability: moderate Assessment/Plan: 
Reassuring fetal status, Labor  Progressing normally Continue expectant management, Continue plan for vaginal delivery. Discussed ambulation/ activity. Doing OK so far with nubain.  
 
Naman Cabrera, DO

## 2018-08-30 NOTE — PROGRESS NOTES
Labor Progress Note Patient seen, fetal heart rate and contraction pattern evaluated, patient examined. Pt ate a sandwich for lunch a little bit ago. Hasn't had any more nubain. Patient Vitals for the past 2 hrs: 
 BP Temp Pulse Resp  
08/30/18 1030 126/70 97.4 °F (36.3 °C) 65 14 Physical Exam: 
Cervical Exam:  4/80 %/-2/  
Uterine Activity: Frequency: Every 2-9 minutes and Intensity: mild Fetal Heart Rate: Baseline: 125 per minute Variability: moderate Accelerations: yes Decelerations: variable Category 2 Assessment/Plan: 
Reassuring fetal status, Labor  Not progressing normally  start pitocin augmentation 
titrating dosage safely, Continue plan for vaginal delivery. No progress. Fetal vertex too high for AROM at this time.   
 
Vasyl Mckeon,

## 2018-08-30 NOTE — PROGRESS NOTES
Labor Progress Note Patient seen, fetal heart rate and contraction pattern evaluated, patient examined. Pt comfortable with epidural. Pitocin is currently off. Patient Vitals for the past 2 hrs: 
 BP Pulse Resp  
08/30/18 1742 117/59 66 16 Physical Exam: 
Cervical Exam:  7/90 %/-1/ unchanged from previous Uterine Activity: Frequency: Every 2-4 minutes and Intensity: strong Fetal Heart Rate: Baseline: 130 per minute Variability: moderate Accelerations: occasional  
Decelerations: variable, occasional late decel Category 2 IUPC placed Attempted FSE placement x 2, unable to monitor FHR with FSE due to skin folds on fetal scalp. Assessment/Plan: 
Reassuring fetal status and poor progress in labor. IUPC placed during this exam to determine adequacy of contractions. Discussed with pt that depending on fetal tolerance of labor, may or may not be able to restart pitocin. If unable to restart pitocin to achieve adequate labor contractions, may need to proceed with PLTCS for delivery. Continue positional changes.   
 
Barnet Him, DO

## 2018-08-30 NOTE — PROGRESS NOTES
8/30/2018  1:54 AM Patient arrived to [facility] with complaints of contractions that began around 0045. Patient denies any bleeding or loss of fluid. 2100 Dr Connie Conteh out at nurse's station, aware of patient's arrival. Patient off monitors to go walking. Will recheck cervix in 30-45min. 3518 Dr Jaime Guerin at bedside to see patient and discuss plan of care. SVE 3/100/-2. Inpatient orders received. 4285 Bedside and Verbal shift change report given to Benoit Branch (oncoming nurse) by Jamee LAIRD (offgoing nurse). Report included the following information SBAR, Kardex, Intake/Output, MAR, Accordion, Recent Results and Med Rec Status.

## 2018-08-30 NOTE — H&P
Labor and Delivery Note 2018 Patient is a 19 y.o. G1 at 40w1d who presented reporting irregular contractions since midnight. She denies bleeding or leaking fluid and reports baby's movement as normal.   
 
Pregnancy has been uncomplicated to date. She is scheduled to be admitted for cervical ripening later today. Cervix was 2 cm at the time of her arrival per RN exam. 
 
PNC: Blood type: O 
          RH: positive Hep B: neg 
          Rubella: immune GBS status: positive OBHX:  
OB History  Para Term  AB Living  
 1 SAB TAB Ectopic Molar Multiple Live Births PMH:  
Past Medical History:  
Diagnosis Date  Other ill-defined conditions(799.89)   
 eczema, ADHD  Psychiatric disorder Anxiety and Depression, not currently on any medications. PSH:  
Past Surgical History:  
Procedure Laterality Date  HX OTHER SURGICAL  2016  
 wisdom teeth   
 
 
OB/GYN: Jose Daniel Lau Meds:  
Prior to Admission Medications Prescriptions Last Dose Informant Patient Reported? Taking?  
prenatal vit calc,iron,folic (PRENATAL VITAMIN PO) 2018 at Unknown time Self Yes Yes Sig: Take 1 Tab by mouth daily. Facility-Administered Medications: None Allergies: Allergies Allergen Reactions  Tylenol [Acetaminophen] Hives Pertinent ROS: Review of Systems - Negative except as per HPI 
 
[address]: History reviewed. No pertinent family history. SH: Social History Social History  Marital status: SINGLE Spouse name: N/A  
 Number of children: N/A  
 Years of education: N/A Social History Main Topics  Smoking status: Former Smoker Packs/day: 0.25 Years: 2.00 Quit date: 2018  Smokeless tobacco: Never Used  Alcohol use No  
 Drug use: No  
 Sexual activity: No  
 
 
 
 
OBJECTIVE: 
 
Temp (24hrs), Av.3 °F (36.8 °C), Min:98.3 °F (36.8 °C), Max:98.3 °F (36.8 °C) Visit Vitals  /78 (BP 1 Location: Left arm, BP Patient Position: At rest)  Pulse 74  Temp 98.3 °F (36.8 °C)  Resp 14  
 Ht 5' 3\" (1.6 m)  Wt 112.5 kg (248 lb)  LMP 11/16/2017  Breastfeeding No  
 BMI 43.93 kg/m2 FHR baseline 125, category 1 [place] irregular contractions Exam: 
General: alert HEENT:  normal  
Abdomen:  Soft, non-tender Cervix:  3/100/-2; BBOW; vertex presentation Fluid:  None seen Extremities:  normal, no edema Clinical EFW 3800 gm Impression: G1 at 40w1d, GBS+; early labor. Will admit for labor management; routine labs and penicillin for GBS prophylaxis ordered. Patient hopes to labor and deliver without an epidural but may want IV pain medication; prn Nubain ordered.  
 
Jyoti Issa MD 
5:15 AM

## 2018-08-30 NOTE — PROGRESS NOTES
4029 Bedside report received from LISANDRO Corbin RN. Pt resting, breathing with Jose Rosas at bedside, SVE 4/80/-2. 
1150 Verbal report given to Kevin Adorno RN.

## 2018-08-30 NOTE — IP AVS SNAPSHOT
[address] 
[phone] Patient: Gerhard Zhang MRN: [id number] LGF:[birth year] A check carrol indicates which time of day the medication should be taken. My Medications START taking these medications Instructions Each Dose to Equal  
 Morning Noon Evening Bedtime HYDROmorphone 2 mg tablet Commonly known as:  DILAUDID Your last dose was: Your next dose is: Take 1 Tab by mouth every four (4) hours as needed. Max Daily Amount: 12 mg.  
 2 mg  
    
   
   
   
  
 ibuprofen 800 mg tablet Commonly known as:  MOTRIN Your last dose was: Your next dose is: Take 1 Tab by mouth every eight (8) hours. 800 mg CONTINUE taking these medications Instructions Each Dose to Equal  
 Morning Noon Evening Bedtime PRENATAL VITAMIN PO Your last dose was: Your next dose is: Take 1 Tab by mouth daily. 1 Tab Where to Get Your Medications Information on where to get these meds will be given to you by the nurse or doctor. ! Ask your nurse or doctor about these medications HYDROmorphone 2 mg tablet  
 ibuprofen 800 mg tablet

## 2018-08-30 NOTE — PROGRESS NOTES
46 Dr. Chapa Comes at bedside. Reviewed tracing. Discussing plan of care to possibly AROM or start Pitocin if no change in SVE.  
 
1131 Up to bathroom. 1136 SVE by Dr. Hari Bustamante. No change. Discussed plan of care to start Pitocin. Patient agrees with plan of care at this time.

## 2018-08-30 NOTE — ANESTHESIA PREPROCEDURE EVALUATION
Anesthetic History No history of anesthetic complications Review of Systems / Medical History Patient summary reviewed, nursing notes reviewed and pertinent labs reviewed Pulmonary Within defined limits Neuro/Psych Within defined limits Cardiovascular Within defined limits GI/Hepatic/Renal 
Within defined limits Endo/Other Within defined limits Obesity Other Findings Physical Exam 
 
Airway Mallampati: II 
TM Distance: > 6 cm Neck ROM: normal range of motion Mouth opening: Normal 
 
 Cardiovascular Regular rate and rhythm,  S1 and S2 normal,  no murmur, click, rub, or gallop Dental 
No notable dental hx Pulmonary Breath sounds clear to auscultation Abdominal 
GI exam deferred Other Findings Anesthetic Plan ASA: 2 Anesthesia type: epidural 
 
 
 
 
Induction: Intravenous Anesthetic plan and risks discussed with: Patient

## 2018-08-30 NOTE — PROGRESS NOTES
Labor Progress Note Patient seen, fetal heart rate and contraction pattern evaluated, patient examined. Pt now comfortable with epidural. 
Patient Vitals for the past 2 hrs: 
 BP Pulse Resp  
08/30/18 1728 114/58 (!) 58 18 Physical Exam: 
Cervical Exam: 7/90/-1 Uterine Activity: Frequency: Every 2-3 minutes and Intensity: strong Fetal Heart Rate: Reactive Baseline: 130 per minute Variability: moderate Decelerations: variable Category 2 tracing Assessment/Plan: 
Reassuring fetal status, Labor  Progressing normally Continue expectant management, Continue plan for vaginal delivery. Pitocin turned off due to variable decels. Will continue to monitor closely and restart as tolerated with fetal reassurance. If no change by next exam, will plan to place IUPC.  
 
Maggy Peters DO

## 2018-08-30 NOTE — ANESTHESIA PROCEDURE NOTES
Epidural Block Performed by: Dawna Serna Authorized by: Dawna Serna Pre-Procedure Indication: labor epidural   
 
Assessment:  
Labor Epidural catheter placement Procedure Note Epidural analgesia requested. All questions answered regarding epidural placement. Patient wishes to proceed. \"Time-out\" performed Patient was placed in the seated position. The back was prepped and draped at the lumbar region. Lidocaine 1% x 2ml was injected locally at ~L3 - L4. A 17G Tuohy needle was placed at the above level. Loss of resistance was obtained, no CSF or paresthesia was apparent. A 19 G epidural catheter was placed and secured at ~7 cm at the skin. Aspiration was negative. No paresthesias noted. A test dose of local anesthetic as charted  was negative for heart rate change, tinnitus, metallic taste or mental status changes. Catheter was secured with Tegaderm and tape. Constant infusion w/ patient controlled bolus as ordered was started by RN. No apparent complications. Vital signs were stable and the patient reported relief.

## 2018-08-30 NOTE — IP AVS SNAPSHOT
[address] [place] [address] 
[phone] Patient: Barb Call MRN: [id number] FM About your hospitalization You were admitted on:  2018 You last received care in the:  [address] [facility] You were discharged on:  2018 Why you were hospitalized Your primary diagnosis was:  Not on File Your diagnoses also included:  Abdominal Pain During Pregnancy, Third Trimester Follow-up Information Follow up With Details Comments Contact Info [facility] [address] 
[phone] [facility], DO Schedule an appointment as soon as possible for a visit in 2 weeks For wound re-check [address] [facility] 57 
[phone] Discharge Orders None A check carrol indicates which time of day the medication should be taken. My Medications START taking these medications Instructions Each Dose to Equal  
 Morning Noon Evening Bedtime HYDROmorphone 2 mg tablet Commonly known as:  DILAUDID Your last dose was: Your next dose is: Take 1 Tab by mouth every four (4) hours as needed. Max Daily Amount: 12 mg.  
 2 mg  
    
   
   
   
  
 ibuprofen 800 mg tablet Commonly known as:  MOTRIN Your last dose was: Your next dose is: Take 1 Tab by mouth every eight (8) hours. 800 mg CONTINUE taking these medications Instructions Each Dose to Equal  
 Morning Noon Evening Bedtime PRENATAL VITAMIN PO Your last dose was: Your next dose is: Take 1 Tab by mouth daily. 1 Tab Where to Get Your Medications Information on where to get these meds will be given to you by the nurse or doctor. ! Ask your nurse or doctor about these medications HYDROmorphone 2 mg tablet  
 ibuprofen 800 mg tablet Opioid Education Prescription Opioids: What You Need to Know: 
 
 
 
 
YOU MUST TAKE YOUR DRESSING OFF ON YOUR INCISION TODAY OR TOMORROW Delivery Type:   Section: What to Expect at [facility] Your Recovery: A  section, or , is surgery to deliver your baby through a cut, called an incision that the doctor makes in your lower belly and uterus. You may have some pain in your lower belly and need pain medicine for 1 to 2 weeks. You can expect some vaginal bleeding for several weeks. You will probably need about 6 weeks to fully recover. It is important to take it easy while the incision is healing. Avoid heavy lifting, strenuous activities, or exercises that strain the belly muscles while you are recovering. Ask a family member or friend for help with housework, cooking, and shopping. This care sheet gives you a general idea about how long it will take for you to recover. But each person recovers at a different pace. Follow the steps below to get better as quickly as possible. How can you care for yourself at home? Activity · Rest when you feel tired. Getting enough sleep will help you recover. · Try to walk each day. Start by walking a little more than you did the day before. Bit by bit, increase the amount you walk. Walking boosts blood flow and helps prevent pneumonia, constipation, and blood clots. · Avoid strenuous activities, such as bicycle riding, jogging, weightlifting, and aerobic exercise, for 6 weeks or until your doctor says it is okay. · Until your doctor says it is okay, do not lift anything heavier than your baby. · Do not do sit-ups or other exercise that strain the belly muscles for 6 weeks or until your doctor says it is okay. · Hold a pillow over your incision when you cough or take deep breaths. This will support your belly and decrease your pain. · You may shower as usual. Pat the incision dry when you are done. · You will have some vaginal bleeding. Wear sanitary pads. Do not douche or use tampons until your doctor says it is okay. · Ask your doctor when you can drive again. · You will probably need to take at least 6 weeks off work. It depends on the type of work you do and how you feel. · Wait until you are healed (about 4 to 6 weeks) before you have sexual intercourse. Your doctor will tell you when it is okay to have sex. · Talk to your doctor about birth control. You can get pregnant even before your period returns. Also, you can get pregnant while you are breast-feeding. Incision care Your skin is your body's first line of defense against germs, but an incision site leaves an easy way for germs to enter your body. To prevent infection: · Clean your hands frequently and before and after changing any touching any dressings. · If you have strips of tape on the incision, leave the tape on for a week or until it falls off. · Look at your incision closely every day for any changes. Contact your doctor if you experience any signs of infection, such as fever or increased redness at the surgical site. · Wash the area daily with warm, soapy water, and pat it dry. Don't use hydrogen peroxide or alcohol, which can slow healing. You may cover the area with a gauze bandage if it weeps or rubs against clothing. Change the bandage every day. · Keep the area clean and dry. Diet · You can eat your normal diet. If your stomach is upset, try bland, low-fat foods like plain rice, broiled chicken, toast, and yogurt. · Drink plenty of fluids (unless your doctor tells you not to). · You may notice that your bowel movements are not regular right after your surgery. This is common. Try to avoid constipation and straining with bowel movements. You may want to take a fiber supplement every day. If you have not had a bowel movement after a couple of days, ask your doctor about taking a mild laxative. · If you are breast-feeding, do not drink any alcohol. Medicines · Take pain medicines exactly as directed. · If the doctor gave you a prescription medicine for pain, take it as prescribed. · If you are not taking a prescription pain medicine, ask your doctor if you can take an over-the-counter medicine such as acetaminophen (Tylenol), ibuprofen (Advil, Motrin), or naproxen (Aleve), for cramps. Read and follow all instructions on the label. Do not take aspirin, because it can cause more bleeding. Do not take acetaminophen (Tylenol) and other acetaminophen containing medications (i.e. Percocet) at the same time. · If you think your pain medicine is making you sick to your stomach: 
· Take your medicine after meals (unless your doctor has told you not to). · Ask your doctor for a different pain medicine. · If your doctor prescribed antibiotics, take them as directed. Do not stop taking them just because you feel better. You need to take the full course of antibiotics. Mental Health · Many women get the \"baby blues\" during the first few days after childbirth. You may lose sleep, feel irritable, and cry easily.  You may feel happy one minute and sad the next. Hormone changes are one cause of these emotional changes. Also, the demands of a new baby, along with visits from relatives or other family needs, add to a mother's stress. The \"baby blues\" often peak around the fourth day. Then they ease up in less than 2 weeks. · If your moodiness or anxiety lasts for more than 2 weeks, or if you feel like life is not worth living, you may have postpartum depression. This is different for each mother. Some mothers with serious depression may worry intensely about their infant's well-being. Others may feel distant from their child. Some mothers might even feel that they might harm their baby. A mother may have signs of paranoia, wondering if someone is watching her. · With all the changes in your life, you may not know if you are depressed. Pregnancy sometimes causes changes in how you feel that are similar to the symptoms of depression. · Symptoms of depression include: · Feeling sad or hopeless and losing interest in daily activities. These are the most common symptoms of depression. · Sleeping too much or not enough. · Feeling tired. You may feel as if you have no energy. · Eating too much or too little. · POSTPARTUM SUPPORT INTERNATIONAL (PSI) offers a Warm line; Chat with the Expert phone sessions; Information and Articles about Pregnancy and Postpartum Mood Disorders; Comprehensive List of Free Support Groups; Knowledgeable local coordinators who will offer support, information, and resources; Guide to Resources on [employer]; Calendar of events in the  mood disorders community; Latest News and Research; and [facility] [address] for [employer]. Remember - You are not alone; You are not to blame; With help, you will be well. 0-764-880-PPD(7944). WWW. POSTPARTUM. NET · Writing or talking about death, such as writing suicide notes or talking about guns, knives, or pills. Keep the numbers for these national suicide hotlines: 9-289-062-TALK (5-458.474.8014) and 7-005-KQKSWYU (1-624.664.3005). If you or someone you know talks about suicide or feeling hopeless, get help right away. Other instructions · If you breast-feed your baby, you may be more comfortable while you are healing if you place the baby so that he or she is not resting on your belly. Try tucking your baby under your arm, with his or her body along the side you will be feeding on. Support your baby's upper body with your arm. With that hand you can control your baby's head to bring his or her mouth to your breast. This is sometimes called the football hold. Follow-up care is a key part of your treatment and safety. Be sure to make and go to all appointments, and call your doctor if you are having problems. It's also a good idea to know your test results and keep a list of the medicines you take. When should you call for help? Call 911 anytime you think you may need emergency care. For example, call if: 
· You are thinking of hurting yourself, your baby, or anyone else. · You passed out (lost consciousness). · You have symptoms of a blood clot in your lung (called a pulmonary embolism). These may include: 
· Sudden chest pain. · Trouble breathing. · Coughing up blood. Call your doctor now or seek immediate medical care if: 
 
· You have severe vaginal bleeding. · You are soaking through a pad each hour for 2 or more hours. · Your vaginal bleeding seems to be getting heavier or is still bright red 4 days after delivery. · You are dizzy or lightheaded, or you feel like you may faint. · You are vomiting or cannot keep fluids down. · You have a fever. · You have new or more belly pain. · You have loose stitches, or your incision comes open. · You have symptoms of infection, such as: 
· Increased pain, swelling, warmth, or redness. · Red streaks leading from the incision. · Pus draining from the incision. · A fever · You pass tissue (not just blood). · Your vaginal discharge smells bad. · Your belly feels tender or full and hard. · Your breasts are continuously painful or red. · You feel sad, anxious, or hopeless for more than a few days. · You have sudden, severe pain in your belly. · You have symptoms of a blood clot in your leg (called a deep vein thrombosis), such as: 
· Pain in your calf, back of the knee, thigh, or groin. · Redness and swelling in your leg or groin. · You have symptoms of preeclampsia, such as: 
· Sudden swelling of your face, hands, or feet. · New vision problems (such as dimness or blurring). · A severe headache. · Your blood pressure is higher than it should be or rises suddenly. · You have new nausea or vomiting. Watch closely for changes in your health, and be sure to contact your doctor if you have any problems. Additional Information:  Postpartum Support PARENTS:  Are you feeling sad or depressed? Is it difficult for you to enjoy yourself? Do you feel more irritable or tense? Do you feel anxious or panicky? Are you having difficulty bonding with your baby? Do you feel as if you are \"out of control\" or \"going crazy\"? Are you worried that you might hurt your baby or yourself? FAMILIES: Do you worry that something is wrong but don't know how to help? Do you think that your partner or spouse is having problems coping? Are you worried that it may never get better? While many women experience some mild mood change or \"the blues\" during or after the birth of a child, 1 in 9 women experience more significant symptoms of depression or anxiety. 1 in 10 Dads become depressed during the first year. Things you can do Being a good parent includes taking care of yourself. If you take care of yourself, you will be able to take better care of your baby and your family. · Talk to a counselor or healthcare provider who has training in  mood and anxiety problems. · Learn as much as you can about pregnancy and postpartum depression and anxiety. · Get support from family and friends. Ask for help when you need it. · Join a support group in your area or online. · Keep active by walking, stretching or whatever form of exercise helps you to feel better. · Get enough rest and time for yourself. · Eat a healthy diet. · Don't give up! It may take more than one try to get the right help you need. These are general instructions for a healthy lifestyle: No smoking/ No tobacco products/ Avoid exposure to second hand smoke Surgeon General's Warning:  Quitting smoking now greatly reduces serious risk to your health. Obesity, smoking, and sedentary lifestyle greatly increases your risk for illness A healthy diet, regular physical exercise & weight monitoring are important for maintaining a healthy lifestyle Recognize signs and symptoms of STROKE: 
 
F-face looks uneven A-arms unable to move or move unevenly S-speech slurred or non-existent T-time-call 911 as soon as signs and symptoms begin - DO NOT go  
    back to bed or wait to see if you get better - TIME IS BRAIN. I have had the opportunity to make my options or choices for discharge. I have received and understand these instructions. [employer] Announcement We are excited to announce that we are making your provider's discharge notes available to you in [employer]. You will see these notes when they are completed and signed by the physician that discharged you from your recent hospital stay. If you have any questions or concerns about any information you see in [employer], please call the Health Information Department where you were seen or reach out to your Primary Care Provider for more information about your plan of care. Introducing [facility]! Ermias Vaughan introduces Zova patient portal. Now you can access parts of your medical record, email your doctor's office, and request medication refills online. 1. In your internet browser, go to [email or web address] 2. Click on the First Time User? Click Here link in the Sign In box. You will see the New Member Sign Up page. 3. Enter your Zova Access Code exactly as it appears below. You will not need to use this code after youve completed the sign-up process. If you do not sign up before the expiration date, you must request a new code. · Zova Access Code: [email or web address] Expires: 12/1/2018 10:58 AM 
 
4. Enter the last four digits of your Social Security Number (xxxx) and Date of Birth (mm/dd/yyyy) as indicated and click Submit. You will be taken to the next sign-up page. 5. Create a Zova ID. This will be your Zova login ID and cannot be changed, so think of one that is secure and easy to remember. 6. Create a Zova password. You can change your password at any time. 7. Enter your Password Reset Question and Answer. This can be used at a later time if you forget your password. 8. Enter your e-mail address. You will receive e-mail notification when new information is available in [address] 9. Click Sign Up. You can now view and download portions of your medical record. 10. Click the Download Summary menu link to download a portable copy of your medical information. If you have questions, please visit the Frequently Asked Questions section of the Zova website. Remember, Zova is NOT to be used for urgent needs. For medical emergencies, dial 911. Now available from your iPhone and Android! Introducing Uriel Madsen As a Ermias Vaughan patient, I wanted to make you aware of our electronic visit tool called Uriel Madsen. Ermias Vaughan 24/7 allows you to connect within minutes with a medical provider 24 hours a day, seven days a week via a mobile device or tablet or logging into a secure website from your computer. You can access [employer] from anywhere in the [place]. A virtual visit might be right for you when you have a simple condition and feel like you just dont want to get out of bed, or cant get away from work for an appointment, when your regular [facility] provider is not available (evenings, weekends or holidays), or when youre out of town and need minor care. Electronic visits cost only $49 and if the [employer] 24/7 provider determines a prescription is needed to treat your condition, one can be electronically transmitted to a nearby pharmacy*. Please take a moment to enroll today if you have not already done so. The enrollment process is free and takes just a few minutes. To enroll, please download the [facility] 24/Vinf[employer]io pauline to your tablet or phone, or visit [email or web address] to enroll on your computer. And, as an [address] [place] [address] patient with a [employer] account, the results of your visits will be scanned into your electronic medical record and your primary care provider will be able to view the scanned results. We urge you to continue to see your regular [facility] provider for your ongoing medical care. And while your primary care provider may not be the one available when you seek a [employer] virtual visit, the peace of mind you get from getting a real diagnosis real time can be priceless. For more information on [employer], view our Frequently Asked Questions (FAQs) at [email or web address]. Sincerely, 
 
Sully Lin MD 
Chief Medical Officer [facility] *:  certain medications cannot be prescribed via [email or web address] Providers Seen During Your Hospitalization Provider Specialty Primary office phone Hector Dorsey DO Obstetrics & Gynecology [phone] Your Primary Care Physician (PCP) Primary Care Physician Office Phone Office Fax Dalia Contreras [phone] You are allergic to the following Allergen Reactions Tylenol (Acetaminophen) Hives Recent Documentation Height Weight Breastfeeding? BMI OB Status Smoking Status 1.6 m 112.5 kg Unknown 43.93 kg/m2 Recent pregnancy Former Smoker Emergency Contacts Name Discharge Info Relation Home Work Mobile Gary Lob DISCHARGE CAREGIVER [3] Mother [14] [phone] Patient Belongings The following personal items are in your possession at time of discharge: 
  Dental Appliances: None  Visual Aid: Glasses      Home Medications: None   Jewelry: Earrings, Ring, Body Piercing (tongue ring)  Clothing: At bedside    Other Valuables: At bedside  Personal Items Sent to Safe: None Please provide this summary of care documentation to your next provider. Signatures-by signing, you are acknowledging that this After Visit Summary has been reviewed with you and you have received a copy. Patient Signature:  ____________________________________________________________ Date:  ____________________________________________________________  
  
Nam Mahan Provider Signature:  ____________________________________________________________ Date:  ____________________________________________________________

## 2018-08-30 NOTE — PROGRESS NOTES
1145- bedside SBAR report received from [address] [place] [address] , [address] [facility]- Dr. Brock Peters in  at bedside sve done 6/90/-1 SROM with vaginal exam  light meconium noted 1557- pt request an epidural, fluid bolus started.   
 
1558- bedside SBAR report given to Thomas Allred RN

## 2018-08-30 NOTE — PROGRESS NOTES
155: Bedside and Verbal shift change report given to VERNELL Santana RN (oncoming nurse) by EVELYNE Walsh RN (offgoing nurse). Report included the following information SBAR, Procedure Summary, MAR and Recent Results. : Dr. Jose Luu requested at bedside for SVE due to variables into the 80's with and after contractions. SVE /-1. All questions addressed at this time. Pitocin in currently still off. 
 
: Pt having variables in the 70's, extra set of hands called to bedside. Positon changed, IV fluid bolus given, oxygen given, Dr. Jose Luu requested at bedside. SVE unchanged, -2. 
 
: Dr. Jose Luu at bedside. SVE, unchanged, -2. IUPC placed. FSE attempted to be placed, not working properly (removed.) Dr. Jose Luu discussing the possibility of a  section with pt due to fetal intolerance of labor. All questions addressed art this time. : Bedside and Verbal shift change report given to HAILEY Lebron RN (oncoming nurse) by Harsh Mai. Janine Santana RN (offgoing nurse). Report included the following information SBAR, Procedure Summary, Intake/Output, MAR, Recent Results and Med Rec Status.

## 2018-08-31 LAB
BASOPHILS # BLD: 0 K/UL (ref 0–0.1)
BASOPHILS NFR BLD: 0 % (ref 0–1)
DIFFERENTIAL METHOD BLD: ABNORMAL
EOSINOPHIL # BLD: 0 K/UL (ref 0–0.4)
EOSINOPHIL NFR BLD: 0 % (ref 0–7)
ERYTHROCYTE [DISTWIDTH] IN BLOOD BY AUTOMATED COUNT: 12.5 % (ref 11.5–14.5)
HCT VFR BLD AUTO: 27.8 % (ref 35–47)
HGB BLD-MCNC: 9.1 G/DL (ref 11.5–16)
IMM GRANULOCYTES # BLD: 0.1 K/UL (ref 0–0.04)
IMM GRANULOCYTES NFR BLD AUTO: 0 % (ref 0–0.5)
LYMPHOCYTES # BLD: 1.6 K/UL (ref 0.8–3.5)
LYMPHOCYTES NFR BLD: 11 % (ref 12–49)
MCH RBC QN AUTO: 29.5 PG (ref 26–34)
MCHC RBC AUTO-ENTMCNC: 32.7 G/DL (ref 30–36.5)
MCV RBC AUTO: 90.3 FL (ref 80–99)
MONOCYTES # BLD: 1.1 K/UL (ref 0–1)
MONOCYTES NFR BLD: 8 % (ref 5–13)
NEUTS SEG # BLD: 11.4 K/UL (ref 1.8–8)
NEUTS SEG NFR BLD: 80 % (ref 32–75)
NRBC # BLD: 0 K/UL (ref 0–0.01)
NRBC BLD-RTO: 0 PER 100 WBC
PLATELET # BLD AUTO: 159 K/UL (ref 150–400)
PMV BLD AUTO: 10.9 FL (ref 8.9–12.9)
RBC # BLD AUTO: 3.08 M/UL (ref 3.8–5.2)
WBC # BLD AUTO: 14.1 K/UL (ref 3.6–11)

## 2018-08-31 PROCEDURE — 85025 COMPLETE CBC W/AUTO DIFF WBC: CPT | Performed by: OBSTETRICS & GYNECOLOGY

## 2018-08-31 PROCEDURE — 65410000002 HC RM PRIVATE OB

## 2018-08-31 PROCEDURE — 74011250636 HC RX REV CODE- 250/636: Performed by: ANESTHESIOLOGY

## 2018-08-31 PROCEDURE — 36415 COLL VENOUS BLD VENIPUNCTURE: CPT | Performed by: OBSTETRICS & GYNECOLOGY

## 2018-08-31 PROCEDURE — 74011250637 HC RX REV CODE- 250/637: Performed by: OBSTETRICS & GYNECOLOGY

## 2018-08-31 RX ORDER — SODIUM CHLORIDE 9 MG/ML
INJECTION INTRAMUSCULAR; INTRAVENOUS; SUBCUTANEOUS
Status: DISPENSED
Start: 2018-08-31 | End: 2018-09-01

## 2018-08-31 RX ADMIN — KETOROLAC TROMETHAMINE 30 MG: 30 INJECTION, SOLUTION INTRAMUSCULAR; INTRAVENOUS at 13:54

## 2018-08-31 RX ADMIN — Medication 10 ML: at 05:55

## 2018-08-31 RX ADMIN — Medication 10 ML: at 13:55

## 2018-08-31 RX ADMIN — IBUPROFEN 800 MG: 400 TABLET ORAL at 21:10

## 2018-08-31 RX ADMIN — KETOROLAC TROMETHAMINE 30 MG: 30 INJECTION, SOLUTION INTRAMUSCULAR; INTRAVENOUS at 05:55

## 2018-08-31 NOTE — ROUTINE PROCESS
65- SBAR report received from [address] [place], [place] [address]- ambulated to restroom to attempt voiding, \"does not have the urge yet\" per pt 
1200- pt voided

## 2018-08-31 NOTE — ANESTHESIA POSTPROCEDURE EVALUATION
Post-Anesthesia Evaluation and Assessment Patient: Socrates Rodriguez MRN: [id number]  SSN: [id number] YOB: 2000  Age: [address] y.o. Sex: female Cardiovascular Function/Vital Signs Visit Vitals  /58  Pulse 71  Temp 36.7 °C (98.1 °F)  Resp 14  
 Ht 5' 3\" (1.6 m)  Wt 112.5 kg (248 lb)  SpO2 99%  Breastfeeding Unknown  BMI 43.93 kg/m2 Patient is status post epidural anesthesia for Procedure(s):  SECTION. Nausea/Vomiting: None Postoperative hydration reviewed and adequate. Pain: 
Pain Scale 1: Numeric (0 - 10) (18) Pain Intensity 1: 0 (18) Managed Neurological Status:  
Neuro (WDL): Within Defined Limits (18 1200) At baseline Mental Status and Level of Consciousness: Arousable Pulmonary Status:  
O2 Device: Room air (180) Adequate oxygenation and airway patent Complications related to anesthesia: None Post-anesthesia assessment completed. No concerns Signed By: Clifford Shore MD   
 2018

## 2018-08-31 NOTE — PROGRESS NOTES
1935: Bedside shift change report given to HAILEY Kaiser RN (oncoming nurse) by Jag Lopez (offgoing nurse). Report included the following information SBAR, Intake/Output, MAR, Accordion and Recent Results. 2000: Dr. Anjali Duncan called to bedside. 2002: Dr. Anjali Duncan at bedside, SVE unchanged. C-section called. 2345:  TRANSFER - OUT REPORT: 
 
Verbal report given to EVELYNE Gallegos RN, RN(name) on Debi Steele  being transferred to MIU(unit) for routine progression of care Report consisted of patients Situation, Background, Assessment and  
Recommendations(SBAR). Information from the following report(s) SBAR, Intake/Output, MAR, Accordion and Recent Results was reviewed with the receiving nurse. Lines:  
Peripheral IV 08/30/18 Left; Lower Arm (Active) Site Assessment Clean, dry, & intact 8/30/2018 10:01 PM  
Phlebitis Assessment 0 8/30/2018 10:01 PM  
Infiltration Assessment 0 8/30/2018 10:01 PM  
Dressing Status Clean, dry, & intact 8/30/2018 10:01 PM  
Dressing Type Tape;Transparent 8/30/2018 10:01 PM  
Hub Color/Line Status Pink; Infusing 8/30/2018 10:01 PM  
  
 
Opportunity for questions and clarification was provided. Patient transported with: 
 Registered Nurse

## 2018-08-31 NOTE — PROGRESS NOTES
Labor Progress Note CTSP By RN for patient having 5 minute Fetal deceleration. Patient seen, fetal heart rate and contraction pattern evaluated. Physical Exam: 
Cervical Exam: 6-7/80/-3 Membranes:  Ruptured Uterine Activity: Frequency: regular Fetal Heart Rate: Reactive Accelerations: No 
Decelerations: 5 minute prolonged late deceleration. Previous 3 minute  Deceleration, and prior repetitive variable and late appearing decelerations Uterine contractions: irregular Assessment/Plan: 
Cat 3 NST 
counseled for Need for  section, risk, benefits, indications and alternatives discussed, including need for further surgery, blood transfusion.  
 
 
Sherry Ordoñez MD

## 2018-08-31 NOTE — L&D DELIVERY NOTE
Operative Note     Name: Mohinder Walker   Medical Record Number: [id number]   YOB: 2000  Today's Date: 2018        Pre-operative Diagnosis: Fetal Intolerance of Labor     Post-operative Diagnosis: Same bbut delivered     Operation: Low Cervical Transverse Procedure(s):   SECTION     Surgeon(s):  María Elena Denny MD     Anesthesia: Epidural     Prophylactic Antibiotics: Ancef  DVT Prophylaxis: Sequential Compression Devices        Fetal Description: clark      Birth Information:   Information for the patient's :  Jamie Dan [[id number]]   Delivery of a   Male [2] infant on 2018 at 8:43 PM. Apgars were 7 and 9.     Umbilical Cord:      Umbilical Cord Events:      Placenta:  removal with  appearance.     Amniotic Fluid Volume: Moderate      Amniotic Fluid Description:  Meconium           Umbilical Cord: 3 vessels present     Placenta:  spontaneous     Specimens: placenta intact      Complications:  none     Procedure Detail:       After proper patient identification and consent, the patient was taken to the operating room, where epidural anesthesia was administered and found to be adequate. Ward catheter had been placed using sterile technique. The patient was prepped and draped in the normal sterile fashion. The abdomen was entered using the Pfannenstiel technique. The peritoneum was entered sharply well superior to the bladder without any apparent injury. The bladder flap was created without difficulty. A low transverse uterine incision was made with the scalpel and extended with blunt finger dissection. Amniotomy was performed and the fluid was medium amount clear. The babys head was then delivered atraumatically. The nose and mouth were suctioned. The cord was clamped and cut and the baby was handed off to Nursing staff in attendance. Placenta was then removed from the uterus.  The uterus was curettaged with a moist lap pad and cleared of all clots and debris. The uterine incision was closed with 0 vicryl, double layer  in running locking fashion with good hemostasis assured followed by an embricating stitch. The posterior cul-de-sac was irrigated with warm normal saline. Good hemostasis was again reassured and the uterus was returned to the abdomen. The anterior pelvis was irrigated with warm normal saline and good hemostasis was again reassured throughout. The rectus muscles were reapproximated with 2-0 vicryl. The fascia was closed with 0 Vicryl in a running fashion. Good hemostasis was assured. The skin was closed with a 3-0 vicryl subcuticular closure. The patient tolerated the procedure well.  Sponge, lap, and needle counts were correct times three and the patient and baby were taken to recovery/postpartum room in stable condition.     Bari El MD  August 30, 2018  9:22 PM

## 2018-08-31 NOTE — PROGRESS NOTES
Post-Operative  Day 1 [place] Information for the patient's :  Salena Garcia [[id number]] , Low Transverse Patient doing well without unusual complaints. Tolerating liquids, no flatus. Has not been OOB yet. Ward out, first void pending. Vitals: 
Visit Vitals  /61 (BP 1 Location: Right arm, BP Patient Position: At rest)  Pulse 67  Temp 98 °F (36.7 °C)  Resp 14  
 Ht 5' 3\" (1.6 m)  Wt 112.5 kg (248 lb)  LMP 2017  SpO2 100%  Breastfeeding Unknown  BMI 43.93 kg/m2 Temp (24hrs), Av.3 °F (36.8 °C), Min:97.4 °F (36.3 °C), Max:99.1 °F (37.3 °C) Last 24hr Input/Output: 
 
Intake/Output Summary (Last 24 hours) at 18 0815 Last data filed at 18 0600 Gross per 24 hour Intake              250 ml Output             2750 ml Net            -2500 ml Exam:   
   Patient without distress. Abdomen:soft, expected tenderness, fundus firm, wound dressing intact Lower extremities are nontender without edema. No cords Labs:  
Lab Results Component Value Date/Time WBC 14.1 (H) 2018 06:18 AM  
 WBC 13.7 (H) 2018 06:01 AM  
 WBC 13.7 (H) 2018 09:54 PM  
 HGB 9.1 (L) 2018 06:18 AM  
 HGB 11.3 (L) 2018 06:01 AM  
 HGB 13.1 2018 09:54 PM  
 HCT 27.8 (L) 2018 06:18 AM  
 HCT 34.2 (L) 2018 06:01 AM  
 HCT 37.8 2018 09:54 PM  
 PLATELET 928  06:18 AM  
 PLATELET 777  06:01 AM  
 PLATELET 564  09:54 PM  
 
 
Recent Results (from the past 24 hour(s)) CBC WITH AUTOMATED DIFF Collection Time: 18  6:18 AM  
Result Value Ref Range WBC 14.1 (H) 3.6 - 11.0 K/uL  
 RBC 3.08 (L) 3.80 - 5.20 M/uL HGB 9.1 (L) 11.5 - 16.0 g/dL HCT 27.8 (L) 35.0 - 47.0 % MCV 90.3 80.0 - 99.0 FL  
 MCH 29.5 26.0 - 34.0 PG  
 MCHC 32.7 30.0 - 36.5 g/dL  
 RDW 12.5 11.5 - 14.5 % PLATELET 921 700 - 342 K/uL MPV 10.9 8.9 - 12.9 FL  
 NRBC 0.0 0  WBC ABSOLUTE NRBC 0.00 0.00 - 0.01 K/uL NEUTROPHILS 80 (H) 32 - 75 % LYMPHOCYTES 11 (L) 12 - 49 % MONOCYTES 8 5 - 13 % EOSINOPHILS 0 0 - 7 % BASOPHILS 0 0 - 1 % IMMATURE GRANULOCYTES 0 0.0 - 0.5 % ABS. NEUTROPHILS 11.4 (H) 1.8 - 8.0 K/UL  
 ABS. LYMPHOCYTES 1.6 0.8 - 3.5 K/UL  
 ABS. MONOCYTES 1.1 (H) 0.0 - 1.0 K/UL  
 ABS. EOSINOPHILS 0.0 0.0 - 0.4 K/UL  
 ABS. BASOPHILS 0.0 0.0 - 0.1 K/UL  
 ABS. IMM. GRANS. 0.1 (H) 0.00 - 0.04 K/UL  
 DF AUTOMATED Assessment: 18 y/o  POD 1 s/p PLTCS for fetal intolerance of labor. O+/RI/male infant Plan:   1. Routine post-operative care 2. Boy, for circumcision. Awaiting first void. Informed consent obtained and signed. Signed By: Nanci Ely DO 2018

## 2018-08-31 NOTE — LACTATION NOTE
This note was copied from a baby's chart. Initial Lactation Consultation - Baby born by  last evening to a  mom at 36 1/7 weeks gestation. Mom said baby has been latching and doing fine. She said she knows a lot about breast feeding and does not need to speak with anyone from lactation. I encouraged her to watch the baby for feeding cues, feed when ever he is acting hungry and call out for assistance as needed.

## 2018-08-31 NOTE — OP NOTES
Operative Note    Name: Mohinder Walker   Medical Record Number: [id number]   YOB: 2000  Today's Date: 2018      Pre-operative Diagnosis: Fetal Intolerance of Labor    Post-operative Diagnosis: Same bbut delivered    Operation: Low Cervical Transverse Procedure(s):   SECTION    Surgeon(s):  María Elena Denny MD    Anesthesia: Epidural    Prophylactic Antibiotics: Ancef  DVT Prophylaxis: Sequential Compression Devices         Fetal Description: clark     Birth Information:   Information for the patient's :  Jamie Dan [[id number]]   Delivery of a   Male [2] infant on 2018 at 8:43 PM. Apgars were 7 and 9. Umbilical Cord:     Umbilical Cord Events:     Placenta:  removal with  appearance. Amniotic Fluid Volume: Moderate     Amniotic Fluid Description:  Meconium        Umbilical Cord: 3 vessels present    Placenta:  spontaneous    Specimens: placenta intact           Complications:  none    Procedure Detail:      After proper patient identification and consent, the patient was taken to the operating room, where epidural anesthesia was administered and found to be adequate. Awrd catheter had been placed using sterile technique. The patient was prepped and draped in the normal sterile fashion. The abdomen was entered using the Pfannenstiel technique. The peritoneum was entered sharply well superior to the bladder without any apparent injury. The bladder flap was created without difficulty. A low transverse uterine incision was made with the scalpel and extended with blunt finger dissection. Amniotomy was performed and the fluid was medium amount clear. The babys head was then delivered atraumatically. The nose and mouth were suctioned. The cord was clamped and cut and the baby was handed off to Nursing staff in attendance. Placenta was then removed from the uterus.  The uterus was curettaged with a moist lap pad and cleared of all clots and debris. The uterine incision was closed with 0 vicryl, double layer  in running locking fashion with good hemostasis assured followed by an embricating stitch. The posterior cul-de-sac was irrigated with warm normal saline. Good hemostasis was again reassured and the uterus was returned to the abdomen. The anterior pelvis was irrigated with warm normal saline and good hemostasis was again reassured throughout. The rectus muscles were reapproximated with 2-0 vicryl. The fascia was closed with 0 Vicryl in a running fashion. Good hemostasis was assured. The skin was closed with a 3-0 vicryl subcuticular closure. The patient tolerated the procedure well. Sponge, lap, and needle counts were correct times three and the patient and baby were taken to recovery/postpartum room in stable condition.     Aram Torre MD  August 30, 2018  9:22 PM

## 2018-08-31 NOTE — ROUTINE PROCESS
TRANSFER - IN REPORT: 
 
Verbal report received from Roc RN(name) on Lang Guardian  being received from L&D(unit) for routine progression of care Report consisted of patients Situation, Background, Assessment and  
Recommendations(SBAR). Information from the following report(s) SBAR, Procedure Summary and Intake/Output was reviewed with the receiving nurse. Opportunity for questions and clarification was provided. Assessment completed upon patients arrival to unit and care assumed.

## 2018-08-31 NOTE — PROGRESS NOTES
0600 CBC drawn and sent to lab. Ward removed,Patient ambulated to bathroom,assisted and instructed in arden care. Tolerated well. Due to void by 12 noon.

## 2018-09-01 PROCEDURE — 74011250637 HC RX REV CODE- 250/637: Performed by: OBSTETRICS & GYNECOLOGY

## 2018-09-01 PROCEDURE — 65410000002 HC RM PRIVATE OB

## 2018-09-01 RX ADMIN — IBUPROFEN 800 MG: 400 TABLET ORAL at 22:02

## 2018-09-01 RX ADMIN — IBUPROFEN 800 MG: 400 TABLET ORAL at 14:07

## 2018-09-01 RX ADMIN — HYDROMORPHONE HYDROCHLORIDE 2 MG: 2 TABLET ORAL at 18:14

## 2018-09-01 RX ADMIN — HYDROMORPHONE HYDROCHLORIDE 2 MG: 2 TABLET ORAL at 09:54

## 2018-09-01 RX ADMIN — IBUPROFEN 800 MG: 400 TABLET ORAL at 05:14

## 2018-09-01 RX ADMIN — HYDROMORPHONE HYDROCHLORIDE 2 MG: 2 TABLET ORAL at 14:08

## 2018-09-01 NOTE — ROUTINE PROCESS
Bedside shift change report given to [address] [place] [address] (oncoming nurse) by Rashad Gardnre. Sen Momin RN (offgoing nurse). Report included the following information SBAR, Procedure Summary, Intake/Output, MAR and Recent Results.

## 2018-09-01 NOTE — PROGRESS NOTES
Post-Operative Day Number 2 Progress Note Patient doing well post-op day 2 from  delivery without significant complaints. Pain controlled on current medication. Voiding without difficulty, normal lochia. Doesn't feel like motrin is enough for pain control. Allergic to tylenol Desires circ Vitals:  Patient Vitals for the past 8 hrs: 
 BP Temp Pulse Resp  
18 0515 104/61 98.1 °F (36.7 °C) 70 16 Temp (24hrs), Av.4 °F (36.9 °C), Min:98.1 °F (36.7 °C), Max:98.7 °F (37.1 °C) Vital signs stable, afebrile. Exam:  Patient without distress. Abdomen soft, fundus firm at level of umbilicus, non tender. Incision dry and clean without erythema. Lower extremities are negative for swelling, cords or tenderness. Lab/Data Review: All lab results for the last 24 hours reviewed. Assessment and Plan:  Patient appears to be having uncomplicated post- course. Continue routine post-op care and maternal education. Circ to be done today Discussed taking dilaudid prn for pain as well. Hx of anxiety/depression: no issues, will need pp follow up Marylee Register MD

## 2018-09-02 VITALS
BODY MASS INDEX: 43.94 KG/M2 | TEMPERATURE: 98.4 F | DIASTOLIC BLOOD PRESSURE: 68 MMHG | WEIGHT: 248 LBS | RESPIRATION RATE: 18 BRPM | HEIGHT: 63 IN | OXYGEN SATURATION: 100 % | HEART RATE: 84 BPM | SYSTOLIC BLOOD PRESSURE: 109 MMHG

## 2018-09-02 PROCEDURE — 74011250637 HC RX REV CODE- 250/637: Performed by: OBSTETRICS & GYNECOLOGY

## 2018-09-02 RX ORDER — IBUPROFEN 800 MG/1
800 TABLET ORAL EVERY 8 HOURS
Qty: 30 TAB | Refills: 1 | Status: SHIPPED | OUTPATIENT
Start: 2018-09-02 | End: 2021-10-05

## 2018-09-02 RX ORDER — HYDROMORPHONE HYDROCHLORIDE 2 MG/1
2 TABLET ORAL
Qty: 25 TAB | Refills: 0 | Status: SHIPPED | OUTPATIENT
Start: 2018-09-02 | End: 2021-10-05

## 2018-09-02 RX ADMIN — IBUPROFEN 800 MG: 400 TABLET ORAL at 05:56

## 2018-09-02 RX ADMIN — HYDROMORPHONE HYDROCHLORIDE 2 MG: 2 TABLET ORAL at 00:20

## 2018-09-02 RX ADMIN — IBUPROFEN 800 MG: 400 TABLET ORAL at 15:12

## 2018-09-02 NOTE — DISCHARGE SUMMARY
Obstetrical Discharge Summary     Name: Cheryl Quevedo MRN: [id number]  SSN: [id number]    YOB: 2000  Age: 19 y.o. Sex: female      Allergies: Tylenol [acetaminophen]    Admit Date: 2018    Discharge Date: 2018     Admitting Physician: Edgar Ledbetter MD     Attending Physician:  Stephan Fong DO     * Admission Diagnoses: Pregnancy   Abdominal pain during pregnancy, third trimester    * Discharge Diagnoses:   Information for the patient's :  Salena aGrcia [[id number]]   Delivery of a 3.856 kg male infant via , Low Transverse on 2018 at 8:43 PM  by . Apgars were 7 and 9. Additional Diagnoses:   Hospital Problems as of 2018  Never Reviewed          Codes Class Noted - Resolved POA    Abdominal pain during pregnancy, third trimester ICD-10-CM: O26.893, R10.9  ICD-9-CM: 646.83, 789.00  2018 - Present Unknown             Lab Results   Component Value Date/Time    ABO/Rh(D) O POSITIVE 2018 06:00 AM    Rubella, External Immune 2018    GrBStrep, External Positive 2018    ABO,Rh O Positive 2018      There is no immunization history on file for this patient. * Procedures:   Procedure(s):   SECTION           * Discharge Condition: good    * Hospital Course: Normal hospital course following the delivery. * Disposition: Home    Discharge Medications:   Current Discharge Medication List      START taking these medications    Details   HYDROmorphone (DILAUDID) 2 mg tablet Take 1 Tab by mouth every four (4) hours as needed. Max Daily Amount: 12 mg.  Qty: 25 Tab, Refills: 0    Associated Diagnoses:  delivery delivered      ibuprofen (MOTRIN) 800 mg tablet Take 1 Tab by mouth every eight (8) hours. Qty: 30 Tab, Refills: 1         CONTINUE these medications which have NOT CHANGED    Details   prenatal vit calc,iron,folic (PRENATAL VITAMIN PO) Take 1 Tab by mouth daily.              * Follow-up Care/Patient Instructions:   Activity: Activity as tolerated, No sex for 6 weeks, No driving while on analgesics and No heavy lifting for 6 weeks  Diet: Regular ,  Wound Care: Keep wound clean and dry, Instructed patient to take her dressing off today or tomorrow    Follow-up Information     Follow up With Details Comments 34 Vania Sanchez MD   [address]  [phone]             Signed By:  Erma Deshpande MD     September 2, 2018

## 2018-09-02 NOTE — DISCHARGE INSTRUCTIONS
POSTPARTUM DISCHARGE INSTRUCTIONS       Name:  Nam Pugh  YOB: 2000  Admission Diagnosis:  Pregnancy   Abdominal pain during pregnancy, third trimester     Discharge Diagnosis:    Problem List as of 2018  Never Reviewed          Codes Class Noted - Resolved    Abdominal pain during pregnancy, third trimester ICD-10-CM: O26.893, R10.9  ICD-9-CM: 646.83, 789.00  2018 - Present            Attending Physician:  Yair Galdamez, DO         YOU MUST TAKE YOUR DRESSING OFF ON YOUR INCISION TODAY OR TOMORROW     Delivery Type:   Section: What to Expect at Home    Your Recovery:  A  section, or , is surgery to deliver your baby through a cut, called an incision that the doctor makes in your lower belly and uterus. You may have some pain in your lower belly and need pain medicine for 1 to 2 weeks. You can expect some vaginal bleeding for several weeks. You will probably need about 6 weeks to fully recover. It is important to take it easy while the incision is healing. Avoid heavy lifting, strenuous activities, or exercises that strain the belly muscles while you are recovering. Ask a family member or friend for help with housework, cooking, and shopping. This care sheet gives you a general idea about how long it will take for you to recover. But each person recovers at a different pace. Follow the steps below to get better as quickly as possible. How can you care for yourself at home? Activity  · Rest when you feel tired. Getting enough sleep will help you recover. · Try to walk each day. Start by walking a little more than you did the day before. Bit by bit, increase the amount you walk. Walking boosts blood flow and helps prevent pneumonia, constipation, and blood clots. · Avoid strenuous activities, such as bicycle riding, jogging, weightlifting, and aerobic exercise, for 6 weeks or until your doctor says it is okay.   · Until your doctor says it is okay, do not lift anything heavier than your baby. · Do not do sit-ups or other exercise that strain the belly muscles for 6 weeks or until your doctor says it is okay. · Hold a pillow over your incision when you cough or take deep breaths. This will support your belly and decrease your pain. · You may shower as usual. Pat the incision dry when you are done. · You will have some vaginal bleeding. Wear sanitary pads. Do not douche or use tampons until your doctor says it is okay. · Ask your doctor when you can drive again. · You will probably need to take at least 6 weeks off work. It depends on the type of work you do and how you feel. · Wait until you are healed (about 4 to 6 weeks) before you have sexual intercourse. Your doctor will tell you when it is okay to have sex. · Talk to your doctor about birth control. You can get pregnant even before your period returns. Also, you can get pregnant while you are breast-feeding. Incision care  Your skin is your body's first line of defense against germs, but an incision site leaves an easy way for germs to enter your body. To prevent infection:  · Clean your hands frequently and before and after changing any touching any dressings. · If you have strips of tape on the incision, leave the tape on for a week or until it falls off. · Look at your incision closely every day for any changes. Contact your doctor if you experience any signs of infection, such as fever or increased redness at the surgical site. · Wash the area daily with warm, soapy water, and pat it dry. Don't use hydrogen peroxide or alcohol, which can slow healing. You may cover the area with a gauze bandage if it weeps or rubs against clothing. Change the bandage every day. · Keep the area clean and dry. Diet  · You can eat your normal diet. If your stomach is upset, try bland, low-fat foods like plain rice, broiled chicken, toast, and yogurt.   · Drink plenty of fluids (unless your doctor tells you not to). · You may notice that your bowel movements are not regular right after your surgery. This is common. Try to avoid constipation and straining with bowel movements. You may want to take a fiber supplement every day. If you have not had a bowel movement after a couple of days, ask your doctor about taking a mild laxative. · If you are breast-feeding, do not drink any alcohol. Medicines  · Take pain medicines exactly as directed. · If the doctor gave you a prescription medicine for pain, take it as prescribed. · If you are not taking a prescription pain medicine, ask your doctor if you can take an over-the-counter medicine such as acetaminophen (Tylenol), ibuprofen (Advil, Motrin), or naproxen (Aleve), for cramps. Read and follow all instructions on the label. Do not take aspirin, because it can cause more bleeding. Do not take acetaminophen (Tylenol) and other acetaminophen containing medications (i.e. Percocet) at the same time. · If you think your pain medicine is making you sick to your stomach:  · Take your medicine after meals (unless your doctor has told you not to). · Ask your doctor for a different pain medicine. · If your doctor prescribed antibiotics, take them as directed. Do not stop taking them just because you feel better. You need to take the full course of antibiotics. Mental Health  · Many women get the \"baby blues\" during the first few days after childbirth. You may lose sleep, feel irritable, and cry easily. You may feel happy one minute and sad the next. Hormone changes are one cause of these emotional changes. Also, the demands of a new baby, along with visits from relatives or other family needs, add to a mother's stress. The \"baby blues\" often peak around the fourth day. Then they ease up in less than 2 weeks. · If your moodiness or anxiety lasts for more than 2 weeks, or if you feel like life is not worth living, you may have postpartum depression.  This is different for each mother. Some mothers with serious depression may worry intensely about their infant's well-being. Others may feel distant from their child. Some mothers might even feel that they might harm their baby. A mother may have signs of paranoia, wondering if someone is watching her. · With all the changes in your life, you may not know if you are depressed. Pregnancy sometimes causes changes in how you feel that are similar to the symptoms of depression. · Symptoms of depression include:  · Feeling sad or hopeless and losing interest in daily activities. These are the most common symptoms of depression. · Sleeping too much or not enough. · Feeling tired. You may feel as if you have no energy. · Eating too much or too little. · POSTPARTUM SUPPORT INTERNATIONAL (PSI) offers a Warm line; Chat with the Expert phone sessions; Information and Articles about Pregnancy and Postpartum Mood Disorders; Comprehensive List of Free Support Groups; Knowledgeable local coordinators who will offer support, information, and resources; Guide to Resources on [employer]ep[employer]; Calendar of events in the  mood disorders community; Latest News and Research; and [facility] [address] for [employer]. Remember - You are not alone; You are not to blame; With help, you will be well. 7-484-499-PPD(8763). WWW. POSTPARTUM. NET   · Writing or talking about death, such as writing suicide notes or talking about guns, knives, or pills. Keep the numbers for these national suicide hotlines: 2-717-985-TALK (6-756.968.1743) and 4-381-UTESCUK (9-229.569.7353). If you or someone you know talks about suicide or feeling hopeless, get help right away. Other instructions  · If you breast-feed your baby, you may be more comfortable while you are healing if you place the baby so that he or she is not resting on your belly. Try tucking your baby under your arm, with his or her body along the side you will be feeding on.  Support your baby's upper body with your arm. With that hand you can control your baby's head to bring his or her mouth to your breast. This is sometimes called the football hold. Follow-up care is a key part of your treatment and safety. Be sure to make and go to all appointments, and call your doctor if you are having problems. It's also a good idea to know your test results and keep a list of the medicines you take. When should you call for help? Call 911 anytime you think you may need emergency care. For example, call if:  · You are thinking of hurting yourself, your baby, or anyone else. · You passed out (lost consciousness). · You have symptoms of a blood clot in your lung (called a pulmonary embolism). These may include:  · Sudden chest pain. · Trouble breathing. · Coughing up blood. Call your doctor now or seek immediate medical care if:    · You have severe vaginal bleeding. · You are soaking through a pad each hour for 2 or more hours. · Your vaginal bleeding seems to be getting heavier or is still bright red 4 days after delivery. · You are dizzy or lightheaded, or you feel like you may faint. · You are vomiting or cannot keep fluids down. · You have a fever. · You have new or more belly pain. · You have loose stitches, or your incision comes open. · You have symptoms of infection, such as:  · Increased pain, swelling, warmth, or redness. · Red streaks leading from the incision. · Pus draining from the incision. · A fever  · You pass tissue (not just blood). · Your vaginal discharge smells bad. · Your belly feels tender or full and hard. · Your breasts are continuously painful or red. · You feel sad, anxious, or hopeless for more than a few days. · You have sudden, severe pain in your belly. · You have symptoms of a blood clot in your leg (called a deep vein thrombosis), such as:  · Pain in your calf, back of the knee, thigh, or groin.   · Redness and swelling in your leg or groin.  · You have symptoms of preeclampsia, such as:  · Sudden swelling of your face, hands, or feet. · New vision problems (such as dimness or blurring). · A severe headache. · Your blood pressure is higher than it should be or rises suddenly. · You have new nausea or vomiting. Watch closely for changes in your health, and be sure to contact your doctor if you have any problems. Additional Information:  Postpartum Support    PARENTS:  Are you feeling sad or depressed? Is it difficult for you to enjoy yourself? Do you feel more irritable or tense? Do you feel anxious or panicky? Are you having difficulty bonding with your baby? Do you feel as if you are \"out of control\" or \"going crazy\"? Are you worried that you might hurt your baby or yourself? FAMILIES: Do you worry that something is wrong but don't know how to help? Do you think that your partner or spouse is having problems coping? Are you worried that it may never get better? While many women experience some mild mood change or \"the blues\" during or after the birth of a child, 1 in 9 women experience more significant symptoms of depression or anxiety. 1 in 10 Dads become depressed during the first year. Things you can do  Being a good parent includes taking care of yourself. If you take care of yourself, you will be able to take better care of your baby and your family. · Talk to a counselor or healthcare provider who has training in  mood and anxiety problems. · Learn as much as you can about pregnancy and postpartum depression and anxiety. · Get support from family and friends. Ask for help when you need it. · Join a support group in your area or online. · Keep active by walking, stretching or whatever form of exercise helps you to feel better. · Get enough rest and time for yourself. · Eat a healthy diet. · Don't give up! It may take more than one try to get the right help you need.        These are general instructions for a healthy lifestyle:    No smoking/ No tobacco products/ Avoid exposure to second hand smoke    Surgeon General's Warning:  Quitting smoking now greatly reduces serious risk to your health. Obesity, smoking, and sedentary lifestyle greatly increases your risk for illness    A healthy diet, regular physical exercise & weight monitoring are important for maintaining a healthy lifestyle    Recognize signs and symptoms of STROKE:    F-face looks uneven    A-arms unable to move or move unevenly    S-speech slurred or non-existent    T-time-call 911 as soon as signs and symptoms begin - DO NOT go       back to bed or wait to see if you get better - TIME IS BRAIN. I have had the opportunity to make my options or choices for discharge. I have received and understand these instructions.

## 2018-09-02 NOTE — ROUTINE PROCESS
Bedside shift change report given to [address] [place] [address] (oncoming nurse) by Elli Pierson. Greg Billings RN (offgoing nurse). Report included the following information SBAR, Procedure Summary, Intake/Output, MAR and Recent Results.

## 2018-09-02 NOTE — LACTATION NOTE
This note was copied from a baby's chart. Mom requesting lactation assistance today. Infant had a 9.1% weight loss. Voiding and stooling adequately. Infant has nursed well, but has gone long stretches between feedings. Discussed with mom the importance of frequent (8-12 times per day) feedings so that her milk will come in adequately. Demonstrated wake up techniques to mom. ASsisted mom with latching infant in the football position. Deep latch obtained with rhythmic sucking and audible swallowing noted. Demonstrated breast massage to mom and obtained 7.5 ml of EBM that will be given via syringe. Mom will also pump following feeding attempts to provide additional stimulation. Infant is to be reweighed at noon to reassess weight loss. Feeding Plan: Mother will keep baby skin to skin as often as possible, feed on demand, 8-12x/day , respond to feeding cues, obtain latch, listen for audible swallowing, be aware of signs of oxytocin release/ cramping,thirst,sleepiness while breastfeeding, offer both breasts,and will not limit feedings. Mother agrees to utilize breast massage while nursing to facilitate lactogenesis. Breasts may become engorged when milk \"comes in\". How milk is made / normal phases of milk production, supply and demand discussed. Taught care of engorged breasts - frequent breastfeeding encouraged, warm compresses and breast massage ac. Then nurse the baby or pump. Apply cold compresses pc x 15 minutes a few times a day for swelling or discomfort. May need to do this care for a couple of days. Discussed prevention and treatment of mastitis. 200 INfant latched and nursed for 10 minutes on each breast and then received 7 ml of EBM via syringe. Mom  Will pump for additional stimulation. Infant reweighed. Weight loss 9.2%. Primary nurse informed and she will call peds hospitalist. (Mom successfully manually expressed EBM) Encouraged mom to feed infant in 2 hours.

## 2018-09-02 NOTE — PROGRESS NOTES
Post-Operative Day Number 3 Progress/Discharge Note Patient doing well post-op day 3 from  delivery without significant complaints. Pain controlled on current medication. Voiding without difficulty, normal lochia. Vitals:  Patient Vitals for the past 8 hrs: 
 BP Temp Pulse Resp  
18 0545 110/68 98.5 °F (36.9 °C) 75 16 Temp (24hrs), Av.4 °F (36.9 °C), Min:98.2 °F (36.8 °C), Max:98.6 °F (37 °C) Vital signs stable, afebrile. Exam:  Patient without distress. Abdomen soft, fundus firm at level of umbilicus, non tender. Incision dry and                      clean without erythema. Lower extremities are negative for swelling, cords or tenderness. Lab/Data Review: All lab results for the last 24 hours reviewed. Assessment and Plan:  Patient appears to be having uncomplicated post- course. Continue routine post-op care and maternal education. Plan discharge for today with follow up in our office in 1-2 weeks. Hx of anxiety/depression: no issues, will need pp follow up Rh+/RI/baby boy circ'd Adrian Suresh MD

## 2018-09-24 ENCOUNTER — APPOINTMENT (OUTPATIENT)
Dept: ULTRASOUND IMAGING | Age: 18
End: 2018-09-24
Attending: EMERGENCY MEDICINE
Payer: MEDICAID

## 2018-09-24 ENCOUNTER — HOSPITAL ENCOUNTER (EMERGENCY)
Age: 18
Discharge: HOME | End: 2018-09-24
Attending: PEDIATRICS
Payer: MEDICAID

## 2018-09-24 VITALS
RESPIRATION RATE: 14 BRPM | WEIGHT: 225.31 LBS | BODY MASS INDEX: 39.91 KG/M2 | SYSTOLIC BLOOD PRESSURE: 111 MMHG | HEART RATE: 64 BPM | TEMPERATURE: 97.9 F | OXYGEN SATURATION: 99 % | DIASTOLIC BLOOD PRESSURE: 56 MMHG

## 2018-09-24 DIAGNOSIS — Z51.89 VISIT FOR WOUND CHECK: Primary | ICD-10-CM

## 2018-09-24 DIAGNOSIS — Z98.891 S/P CESAREAN SECTION: ICD-10-CM

## 2018-09-24 PROCEDURE — 96372 THER/PROPH/DIAG INJ SC/IM: CPT

## 2018-09-24 PROCEDURE — 74011250636 HC RX REV CODE- 250/636: Performed by: EMERGENCY MEDICINE

## 2018-09-24 PROCEDURE — 99282 EMERGENCY DEPT VISIT SF MDM: CPT

## 2018-09-24 PROCEDURE — 76857 US EXAM PELVIC LIMITED: CPT

## 2018-09-24 RX ORDER — KETOROLAC TROMETHAMINE 30 MG/ML
60 INJECTION, SOLUTION INTRAMUSCULAR; INTRAVENOUS
Status: COMPLETED | OUTPATIENT
Start: 2018-09-24 | End: 2018-09-24

## 2018-09-24 RX ADMIN — KETOROLAC TROMETHAMINE 60 MG: 30 INJECTION, SOLUTION INTRAMUSCULAR at 16:27

## 2018-09-24 NOTE — DISCHARGE INSTRUCTIONS
Please only take motrin or tylenol as needed for pain. Limit pushing, pulling and stairs    Follow up with your obstetrician as scheduled           Wound Check: Care Instructions  Your Care Instructions  People have wounds that need care for many reasons. You may have a cut that needs care after surgery. You may have a cut or puncture wound from an accident. Or you may have a wound because of a condition like diabetes. Whatever the cause of your wound, there are things you can do to care for it at home. Your doctor may also want you to come back for a wound check. The wound check lets the doctor know how your wound is healing and if you need more treatment. Follow-up care is a key part of your treatment and safety. Be sure to make and go to all appointments, and call your doctor if you are having problems. It's also a good idea to know your test results and keep a list of the medicines you take. How can you care for yourself at home? · If your doctor told you how to care for your wound, follow your doctor's instructions. If you did not get instructions, follow this general advice:  ¨ You may cover the wound with a thin layer of petroleum jelly, such as Vaseline, and a nonstick bandage. ¨ Apply more petroleum jelly and replace the bandage as needed. · Keep the wound dry for the first 24 to 48 hours. After this, you can shower if your doctor okays it. Pat the wound dry. · Be safe with medicines. Read and follow all instructions on the label. ¨ If the doctor gave you a prescription medicine for pain, take it as prescribed. ¨ If you are not taking a prescription pain medicine, ask your doctor if you can take an over-the-counter medicine. · If your doctor prescribed antibiotics, take them as directed. Do not stop taking them just because you feel better. You need to take the full course of antibiotics. · If you have stitches, do not remove them on your own.  Your doctor will tell you when to come back to have them removed. · If you have Steri-Strips, leave them on until they fall off. · If possible, prop up the injured area on a pillow anytime you sit or lie down during the next 3 days. Try to keep it above the level of your heart. This will help reduce swelling. When should you call for help? Call your doctor now or seek immediate medical care if:    · You have new pain, or the pain gets worse.     · The skin near the wound is cold or pale or changes color.     · You have tingling, weakness, or numbness near the wound.     · The wound starts to bleed, and blood soaks through the bandage. Oozing small amounts of blood is normal.     · You have symptoms of infection, such as:  ¨ Increased pain, swelling, warmth, or redness. ¨ Red streaks leading from the wound. ¨ Pus draining from the wound. ¨ A fever.    Watch closely for changes in your health, and be sure to contact your doctor if:    · You do not get better as expected. Where can you learn more? Go to http://jonathan-lara.info/. Enter P342 in the search box to learn more about \"Wound Check: Care Instructions. \"  Current as of: November 20, 2017  Content Version: 11.7  © 4551-8936 [employer]. Care instructions adapted under license by [employer] (which disclaims liability or warranty for this information). If you have questions about a medical condition or this instruction, always ask your healthcare professional. [address] any warranty or liability for your use of this information. We hope that we have addressed all of your medical concerns. The examination and treatment you received in the Emergency Department were for an emergent problem and were not intended as complete care. It is important that you follow up with your healthcare provider(s) for ongoing care.  If your symptoms worsen or do not improve as expected, and you are unable to reach your usual health care provider(s), you should return to the Emergency Department. Today's healthcare is undergoing tremendous change, and patient satisfaction surveys are one of the many tools to assess the quality of medical care. You may receive a survey from the [employer] regarding your experience in the Emergency Department. I hope that your experience has been completely positive, particularly the medical care that I provided. As such, please participate in the survey; anything less than excellent does not meet my expectations or intentions. [address] and [facility] participate in nationally recognized quality of care measures. If your blood pressure is greater than 120/80, as reported below, we urge that you seek medical care to address the potential of high blood pressure, commonly known as hypertension. Hypertension can be hereditary or can be caused by certain medical conditions, pain, stress, or \"white coat syndrome. \"       Please make an appointment with your health care provider(s) for follow up of your Emergency Department visit. VITALS:   Patient Vitals for the past 8 hrs:   Temp Pulse Resp BP SpO2   18 1844 97.9 °F (36.6 °C) 64 14 111/56 99 %   18 1605 98.1 °F (36.7 °C) 67 16 137/84 100 %          Thank you for allowing us to provide you with medical care today. We realize that you have many choices for your emergency care needs. Please choose us in the future for any continued health care needs. MD URMILA Dumont [facility]: [phone]            No results found for this or any previous visit (from the past 24 hour(s)). Us Pelv Non Obs  Ltd    Result Date: 2018  EXAM:  US PELV NON OBS  LTD INDICATION: Incisional pain, 3 weeks postpartum  section. COMPARISON: None.  TECHNIQUE: Real-time sonography of the patient's incision was performed with a high frequency linear transducer and multiple static images were obtained. FINDINGS: There is subcutaneous soft tissue and edema at the  section incision site. There is no drainable fluid collection or abscess. There is a 2.1 x 1.1 x 1.1 cm echogenic structure which may represent a small area of fat infarction or hematoma. IMPRESSION: No drainable abscess or collection.

## 2018-09-24 NOTE — ED PROVIDER NOTES
Patient is a 19 y.o. female presenting with other event. Other Pertinent negatives include no abdominal pain, no headaches and no shortness of breath. Pt presents to the ED reporting Csection incision site pain. She is 25days post partum; A0. She denies any fever, bleeding or discharge. Denies any  difficulty breathing, difficulty swallowing, SOB or chest pain. Denies any nausea, vomiting, urinary symptoms or diarrhea. Pt. Reports that she was taking motrin and dilaudid for pain up until 16. She states that she has not had any pain medications since then. She is breast feeding. Pain increases with bending, lifting and stair climbing. Old charts reviewed. Past Medical History:  
Diagnosis Date  Other ill-defined conditions(919.15)   
 eczema, ADHD  Psychiatric disorder Anxiety and Depression, not currently on any medications. Past Surgical History:  
Procedure Laterality Date  HX OTHER SURGICAL  2016  
 wisdom teeth History reviewed. No pertinent family history. Social History Social History  Marital status: SINGLE Spouse name: N/A  
 Number of children: N/A  
 Years of education: N/A Occupational History  Not on file. Social History Main Topics  Smoking status: Former Smoker Packs/day: 0.25 Years: 2.00 Quit date: 2018  Smokeless tobacco: Never Used  Alcohol use No  
 Drug use: No  
 Sexual activity: No  
 
Other Topics Concern  Not on file Social History Narrative ALLERGIES: Tylenol [acetaminophen] Review of Systems Constitutional: Negative for activity change and appetite change. HENT: Negative for facial swelling, sore throat and trouble swallowing. Eyes: Negative. Respiratory: Negative for shortness of breath. Cardiovascular: Negative. Gastrointestinal: Negative for abdominal pain, diarrhea and vomiting. Genitourinary: Negative for dysuria. Musculoskeletal: Negative for back pain and neck pain. Skin: Positive for wound. Negative for color change. Neurological: Negative for headaches. Psychiatric/Behavioral: Negative. Vitals:  
 09/24/18 1605 BP: 137/84 Pulse: 67 Resp: 16 Temp: 98.1 °F (36.7 °C) SpO2: 100% Weight: 102.2 kg (225 lb 5 oz) Physical Exam  
Constitutional: She is oriented to person, place, and time. She appears well-nourished. Black female; non smoker; Daxa Parra HENT:  
Head: Normocephalic. Mouth/Throat: Oropharynx is clear and moist.  
Eyes: Pupils are equal, round, and reactive to light. Neck: Normal range of motion. Neck supple. Cardiovascular: Normal rate and regular rhythm. Pulmonary/Chest: Effort normal and breath sounds normal.  
Abdominal: Soft. Bowel sounds are normal. She exhibits no distension and no mass. There is tenderness. There is no rebound and no guarding. Csection incision site is well approximated; no bleeding, drainage, bruising, fluctuance or erythema Musculoskeletal: Normal range of motion. Neurological: She is alert and oriented to person, place, and time. Skin: Skin is warm and dry. Nursing note and vitals reviewed. MDM 
 
 
ED Course Procedures Wound care instructions were reviewed with the pt. She was encouraged to follow up with her OB/GYN for further evaluation. Patient's results and plan of care have been reviewed with her and her . Patient and/or family have verbally conveyed their understanding and agreement of the patient's signs, symptoms, diagnosis, treatment and prognosis and additionally agree to follow up as recommended or return to the Emergency Room should her condition change prior to follow-up.   Discharge instructions have also been provided to the patient with some educational information regarding her diagnosis as well a list of reasons why she would want to return to the ER prior to her follow-up appointment should her condition change. Discussed plan of care with Dr. Kyleigh Dominguez.  Guillermo Sanchez, NELLY

## 2018-09-24 NOTE — PROGRESS NOTES
Date of previous inpatient admission/ ED visit? Patient last admitted - for Pregnancy. What brought the patient back to ED? Chart reviewed. It was reported that patient had a  3 weeks ago and is having an odor to the area as well as pain to the area Did patient decline recommended services during last admission/ ED visit (if yes, what)? NO 
 
CM Interventions: 
From previous inpatient admission/ED visit:  Patient discharged home with no CM needs. From current inpatient admission/ED visit:  Patient currently in the ED being evaluated and needs assessed. There are no current CM consults or needs at this time. Disposition needs TBD/subject to change pending recommendations. CM will continue to follow and assist with disposition needs as they arise.  
 
STEFANIA Han/TI 
4:45 PM

## 2019-08-26 ENCOUNTER — HOSPITAL ENCOUNTER (EMERGENCY)
Age: 19
Discharge: HOME | End: 2019-08-27
Attending: EMERGENCY MEDICINE
Payer: MEDICAID

## 2019-08-26 VITALS
HEIGHT: 63 IN | TEMPERATURE: 98.1 F | WEIGHT: 223 LBS | DIASTOLIC BLOOD PRESSURE: 85 MMHG | HEART RATE: 87 BPM | RESPIRATION RATE: 18 BRPM | SYSTOLIC BLOOD PRESSURE: 123 MMHG | BODY MASS INDEX: 39.51 KG/M2 | OXYGEN SATURATION: 100 %

## 2019-08-26 DIAGNOSIS — N76.4 LEFT GENITAL LABIAL ABSCESS: Primary | ICD-10-CM

## 2019-08-26 PROCEDURE — 99283 EMERGENCY DEPT VISIT LOW MDM: CPT

## 2019-08-26 RX ORDER — HYDROCODONE BITARTRATE AND ACETAMINOPHEN 5; 325 MG/1; MG/1
1 TABLET ORAL ONCE
Status: COMPLETED | OUTPATIENT
Start: 2019-08-26 | End: 2019-08-27

## 2019-08-26 RX ORDER — HYDROCODONE BITARTRATE AND ACETAMINOPHEN 5; 325 MG/1; MG/1
1 TABLET ORAL
Qty: 20 TAB | Refills: 0 | Status: SHIPPED | OUTPATIENT
Start: 2019-08-26 | End: 2019-08-29

## 2019-08-26 RX ORDER — SULFAMETHOXAZOLE AND TRIMETHOPRIM 800; 160 MG/1; MG/1
1 TABLET ORAL
Status: COMPLETED | OUTPATIENT
Start: 2019-08-26 | End: 2019-08-27

## 2019-08-26 RX ORDER — SULFAMETHOXAZOLE AND TRIMETHOPRIM 800; 160 MG/1; MG/1
1 TABLET ORAL 2 TIMES DAILY
Qty: 14 TAB | Refills: 0 | Status: SHIPPED | OUTPATIENT
Start: 2019-08-26 | End: 2019-09-02

## 2019-08-26 NOTE — LETTER
[facility] EMERGENCY DEPT 
[address] [address] 
[phone] Work/School Note Date: 8/26/2019 To Whom It May concern: 
 
Jyothi Edwards was seen and treated today in the emergency room by the following provider(s): 
Attending Provider: Day Stout MD. Jyothi Edwards may return to work on 8/28/19. Sincerely, Rowan Moy MD

## 2019-08-27 PROCEDURE — 74011250637 HC RX REV CODE- 250/637: Performed by: EMERGENCY MEDICINE

## 2019-08-27 RX ADMIN — SULFAMETHOXAZOLE AND TRIMETHOPRIM 1 TABLET: 800; 160 TABLET ORAL at 00:09

## 2019-08-27 RX ADMIN — HYDROCODONE BITARTRATE AND ACETAMINOPHEN 1 TABLET: 5; 325 TABLET ORAL at 00:09

## 2019-08-27 NOTE — ED NOTES
Patient has been instructed that they have been given Norco* which contains opioids, benzodiazepines, or other sedating drugs. Patient is aware that they  will need to refrain from driving or operating heavy machinery after taking this medication. Patient also instructed that they need to avoid drinking alcohol and using other products containing opioids, benzodiazepines, or other sedating drugs. Patient verbalized understanding. Pt states her grandmother is picking her up.  Pt states she will not be driving

## 2019-08-27 NOTE — ED NOTES
Discharge instructions were given to the patient by Randee Jaeger.     The patient left the Emergency Department ambulatory, alert and oriented and in no acute distress with 2 prescriptions. The patient was encouraged to call or return to the ED for worsening issues or problems and was encouraged to schedule a follow up appointment for continuing care. The patient verbalized understanding of discharge instructions and prescriptions, all questions were answered. The patient has no further concerns at this time.

## 2019-08-27 NOTE — ED NOTES
Pt presents ambulatory to ED complaining of vaginal abscess x1 week. Pt reports soaking area, with no relief. Pt states pain worsened today. Pt is alert and oriented x 4, RR even and unlabored, skin is warm and dry. Assesment completed and pt updated on plan of care. Emergency Department Nursing Plan of Care       The Nursing Plan of Care is developed from the Nursing assessment and Emergency Department Attending provider initial evaluation. The plan of care may be reviewed in the ED Provider note.     The Plan of Care was developed with the following considerations:   Patient / Family readiness to learn indicated by:verbalized understanding  Persons(s) to be included in education: patient  Barriers to Learning/Limitations:No    [address]    8/26/2019   11:09 PM

## 2019-08-27 NOTE — ED PROVIDER NOTES
EMERGENCY DEPARTMENT HISTORY AND PHYSICAL EXAM      Date: 8/26/2019  Patient Name: Dontae Cazares    History of Presenting Illness     Chief Complaint   Patient presents with    Skin Problem       History Provided By: Patient    HPI: Dontae Cazares, 22 y.o. female with PMHx significant for perineal skin lesion, presents by private vehicle to the ED with cc of possible skin abscess of the labia. This is a 20-year-old female with a left labial lesion that is enlarged over the past couple days. She thinks it is an abscess and has tried to pinch it unsuccessfully. She has no other symptoms. Out of 10 in the left labia and sharp. There are no other complaints, changes, or physical findings at this time. PCP: Bekah Dorado MD    Current Outpatient Medications   Medication Sig Dispense Refill    trimethoprim-sulfamethoxazole (BACTRIM DS) 160-800 mg per tablet Take 1 Tab by mouth two (2) times a day for 7 days. 14 Tab 0    HYDROcodone-acetaminophen (NORCO) 5-325 mg per tablet Take 1 Tab by mouth every four (4) hours as needed for Pain for up to 3 days. Max Daily Amount: 6 Tabs. 20 Tab 0    HYDROmorphone (DILAUDID) 2 mg tablet Take 1 Tab by mouth every four (4) hours as needed. Max Daily Amount: 12 mg. 25 Tab 0    ibuprofen (MOTRIN) 800 mg tablet Take 1 Tab by mouth every eight (8) hours. 30 Tab 1    prenatal vit calc,iron,folic (PRENATAL VITAMIN PO) Take 1 Tab by mouth daily. Past History     Past Medical History:  Past Medical History:   Diagnosis Date    Other ill-defined conditions(310.99)     eczema, ADHD    Psychiatric disorder     Anxiety and Depression, not currently on any medications. Past Surgical History:  Past Surgical History:   Procedure Laterality Date    HX OTHER SURGICAL  2016    wisdom teeth        Family History:  History reviewed. No pertinent family history.     Social History:  Social History     Tobacco Use    Smoking status: Former Smoker Packs/day: 0.25     Years: 2.00     Pack years: 0.50     Last attempt to quit: 2018     Years since quittin.6    Smokeless tobacco: Never Used   Substance Use Topics    Alcohol use: No    Drug use: No       Allergies: Allergies   Allergen Reactions    Tylenol [Acetaminophen] Hives         Review of Systems   Review of Systems   Constitutional: Negative for chills and fever. HENT: Negative for congestion, rhinorrhea, sneezing and sore throat. Respiratory: Negative for shortness of breath. Cardiovascular: Negative for chest pain. Gastrointestinal: Negative for abdominal pain, nausea and vomiting. Musculoskeletal: Negative for back pain, myalgias and neck stiffness. Skin: Negative for rash. Neurological: Negative for dizziness, weakness and headaches. All other systems reviewed and are negative. Physical Exam   Physical Exam   Constitutional: She is oriented to person, place, and time. She appears well-developed and well-nourished. HENT:   Head: Normocephalic and atraumatic. Mouth/Throat: Oropharynx is clear and moist.   Eyes: Conjunctivae and EOM are normal.   Neck: Normal range of motion and full passive range of motion without pain. Neck supple. Cardiovascular: Normal rate, regular rhythm, S1 normal, S2 normal, normal heart sounds, intact distal pulses and normal pulses. No murmur heard. Pulmonary/Chest: Effort normal and breath sounds normal. No respiratory distress. She has no wheezes. Abdominal: Soft. Normal appearance and bowel sounds are normal. She exhibits no distension. There is no tenderness. There is no rebound. Genitourinary:   Genitourinary Comments: Small left labial swelling and tenderness is noted. No absolute abscess to be drained today. Musculoskeletal: Normal range of motion. Neurological: She is alert and oriented to person, place, and time. She has normal strength. Skin: Skin is warm, dry and intact. No rash noted.    Psychiatric: She has a normal mood and affect. Her speech is normal and behavior is normal. Judgment and thought content normal.   Nursing note and vitals reviewed. Diagnostic Study Results     Labs -   No results found for this or any previous visit (from the past 12 hour(s)). Radiologic Studies -   No orders to display     CT Results  (Last 48 hours)    None        CXR Results  (Last 48 hours)    None            Medical Decision Making   I am the first provider for this patient. I reviewed the vital signs, available nursing notes, past medical history, past surgical history, family history and social history. Vital Signs-Reviewed the patient's vital signs. Patient Vitals for the past 12 hrs:   Temp Pulse Resp BP SpO2   08/26/19 2257 98.1 °F (36.7 °C) 87 18 123/85 100 %         Records Reviewed: Nursing Notes    Provider Notes (Medical Decision Making):   Left labial abscess versus cellulitis. There is no lesion requiring drainage today. ED Course:   Initial assessment performed. The patients presenting problems have been discussed, and they are in agreement with the care plan formulated and outlined with them. I have encouraged them to ask questions as they arise throughout their visit. Patient is given Bactrim and Norco in the emergency department with prescriptions for both. Disposition:  Patient informed of results of workup and is comfortable with discharge to home to follow up with PCP. They are instructed to return as needed for worsening condition. PLAN:  1. Discharge Medication List as of 8/26/2019 11:56 PM      START taking these medications    Details   trimethoprim-sulfamethoxazole (BACTRIM DS) 160-800 mg per tablet Take 1 Tab by mouth two (2) times a day for 7 days. , Print, Disp-14 Tab, R-0      HYDROcodone-acetaminophen (NORCO) 5-325 mg per tablet Take 1 Tab by mouth every four (4) hours as needed for Pain for up to 3 days.  Max Daily Amount: 6 Tabs., Print, Disp-20 Tab, R-0 CONTINUE these medications which have NOT CHANGED    Details   HYDROmorphone (DILAUDID) 2 mg tablet Take 1 Tab by mouth every four (4) hours as needed. Max Daily Amount: 12 mg., Print, Disp-25 Tab, R-0      ibuprofen (MOTRIN) 800 mg tablet Take 1 Tab by mouth every eight (8) hours. , Print, Disp-30 Tab, R-1      prenatal vit calc,iron,folic (PRENATAL VITAMIN PO) Take 1 Tab by mouth daily. , Historical Med           2. Follow-up Information     Follow up With Specialties Details Why [id number] [facility] OBGYN AT [facility] Gynecology Schedule an appointment as soon as possible for a visit  [facility] [address] [address] [phone]    [facility] EMERGENCY DEPT Emergency Medicine  As needed, If symptoms worsen [address] [facility]  [phone]        Return to ED if worse     Diagnosis     Clinical Impression:   1.  Left genital labial abscess

## 2019-08-27 NOTE — DISCHARGE INSTRUCTIONS
Patient Education        Perineal Abscess: Care Instructions  Your Care Instructions  A perineal abscess is an infection that causes a painful lump in the perineum. The perineum is the area between the scrotum and the anus in a man. In a woman, it's the area between the vulva and the anus. The area may look red and feel painful and be swollen. The abscess may form after surgery or after delivery of a baby. It can also be caused by an infection of the prostate gland. The prostate gland is an organ found inside a man's body, just below the bladder. Your doctor may have done minor surgery to open and drain the abscess. You may have had a sedative to help you relax. You may be unsteady after having sedation. It can take a few hours for the medicine's effects to wear off. Common side effects include nausea, vomiting, and feeling sleepy or tired. The doctor has checked you carefully, but problems can develop later. If you notice any problems or new symptoms, get medical treatment right away. Follow-up care is a key part of your treatment and safety. Be sure to make and go to all appointments, and call your doctor if you are having problems. It's also a good idea to know your test results and keep a list of the medicines you take. How can you care for yourself at home? · If your doctor gave you a sedative:  ? For 24 hours, don't do anything that requires attention to detail. This includes going to work, making important decisions, or signing any legal documents. It takes time for the medicine's effects to completely wear off.  ? For your safety, do not drive or operate any machinery that could be dangerous. Wait until the medicine wears off. You need to be able to think clearly and react easily. · Be safe with medicines. Read and follow all instructions on the label. ? If the doctor gave you a prescription medicine for pain, take it as prescribed.   ? If you are not taking a prescription pain medicine, ask your doctor if you can take an over-the-counter medicine. · If your doctor prescribed antibiotics, take them as directed. Do not stop taking them just because you feel better. You need to take the full course of antibiotics. · Sit in a few inches of warm water (sitz bath) 3 times a day and after bowel movements. The warm water helps the area heal and eases discomfort. When should you call for help? Call 911 anytime you think you may need emergency care. For example, call if:    · You have trouble breathing.     · You passed out (lost consciousness).    Call your doctor now or seek immediate medical care if:    · You have symptoms of infection, such as:  ? Increased pain, swelling, warmth, or redness. ? Red streaks leading from the area. ? Pus draining from the area. ? A fever.    Watch closely for changes in your health, and be sure to contact your doctor if:    · You do not get better as expected. Where can you learn more? Go to http://jonathan-lara.info/. Enter 96 [id number] in the search box to learn more about \"Perineal Abscess: Care Instructions. \"  Current as of: November 7, 2018  Content Version: 12.1  © 6630-6858 [employer]. Care instructions adapted under license by [employer] (which disclaims liability or warranty for this information). If you have questions about a medical condition or this instruction, always ask your healthcare professional. [place] [address] any warranty or liability for your use of this information.

## 2021-06-21 ENCOUNTER — HOSPITAL ENCOUNTER (EMERGENCY)
Age: 21
Discharge: HOME | End: 2021-06-21
Attending: EMERGENCY MEDICINE
Payer: MEDICAID

## 2021-06-21 VITALS
TEMPERATURE: 98 F | WEIGHT: 229.94 LBS | RESPIRATION RATE: 16 BRPM | SYSTOLIC BLOOD PRESSURE: 114 MMHG | DIASTOLIC BLOOD PRESSURE: 83 MMHG | HEIGHT: 63 IN | BODY MASS INDEX: 40.74 KG/M2 | OXYGEN SATURATION: 100 % | HEART RATE: 81 BPM
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
## 2021-06-21 DIAGNOSIS — S16.1XXA STRAIN OF NECK MUSCLE, INITIAL ENCOUNTER: Primary | ICD-10-CM

## 2021-06-21 DIAGNOSIS — S76.011A HIP STRAIN, RIGHT, INITIAL ENCOUNTER: ICD-10-CM

## 2021-06-21 DIAGNOSIS — S80.01XA CONTUSION OF RIGHT KNEE, INITIAL ENCOUNTER: ICD-10-CM

## 2021-06-21 LAB — SARS-COV-2, COV2: NORMAL

## 2021-06-21 PROCEDURE — U0003 INFECTIOUS AGENT DETECTION BY NUCLEIC ACID (DNA OR RNA); SEVERE ACUTE RESPIRATORY SYNDROME CORONAVIRUS 2 (SARS-COV-2) (CORONAVIRUS DISEASE [COVID-19]), AMPLIFIED PROBE TECHNIQUE, MAKING USE OF HIGH THROUGHPUT TECHNOLOGIES AS DESCRIBED BY CMS-2020-01-R: HCPCS

## 2021-06-21 PROCEDURE — 99282 EMERGENCY DEPT VISIT SF MDM: CPT

## 2021-06-21 PROCEDURE — 74011250637 HC RX REV CODE- 250/637: Performed by: PHYSICIAN ASSISTANT

## 2021-06-21 RX ORDER — CYCLOBENZAPRINE HCL 10 MG
10 TABLET ORAL
Status: COMPLETED | OUTPATIENT
Start: 2021-06-21 | End: 2021-06-21

## 2021-06-21 RX ORDER — NAPROXEN 250 MG/1
500 TABLET ORAL
Status: COMPLETED | OUTPATIENT
Start: 2021-06-21 | End: 2021-06-21

## 2021-06-21 RX ORDER — NAPROXEN 500 MG/1
500 TABLET ORAL 2 TIMES DAILY WITH MEALS
Qty: 20 TABLET | Refills: 0 | Status: SHIPPED | OUTPATIENT
Start: 2021-06-21 | End: 2021-07-01

## 2021-06-21 RX ORDER — CYCLOBENZAPRINE HCL 10 MG
10 TABLET ORAL
Qty: 20 TABLET | Refills: 0 | Status: SHIPPED | OUTPATIENT
Start: 2021-06-21 | End: 2021-10-05

## 2021-06-21 RX ADMIN — CYCLOBENZAPRINE 10 MG: 10 TABLET, FILM COATED ORAL at 20:06

## 2021-06-21 RX ADMIN — NAPROXEN 500 MG: 250 TABLET ORAL at 20:06

## 2021-06-22 ENCOUNTER — PATIENT OUTREACH (OUTPATIENT)
Dept: CASE MANAGEMENT | Age: 21
End: 2021-06-22

## 2021-06-22 LAB
SARS-COV-2, XPLCVT: NOT DETECTED
SOURCE, COVRS: NORMAL
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
## 2021-06-22 NOTE — ED NOTES
Discharge information reviewed by JERSEY Blas. Pt verbalized understanding of discharge instructions. Discharge note signed. Pt discharged without difficulty. Pt discharged in stable condition via ambulation, accompanied by sister.

## 2021-10-05 ENCOUNTER — HOSPITAL ENCOUNTER (EMERGENCY)
Age: 21
Discharge: HOME | End: 2021-10-05
Attending: EMERGENCY MEDICINE
Payer: MEDICAID

## 2021-10-05 VITALS
SYSTOLIC BLOOD PRESSURE: 116 MMHG | HEART RATE: 67 BPM | DIASTOLIC BLOOD PRESSURE: 60 MMHG | OXYGEN SATURATION: 98 % | RESPIRATION RATE: 20 BRPM | TEMPERATURE: 98.2 F | HEIGHT: 63 IN | WEIGHT: 215 LBS | BODY MASS INDEX: 38.09 KG/M2

## 2021-10-05 DIAGNOSIS — Z20.822 PERSON UNDER INVESTIGATION FOR COVID-19: ICD-10-CM

## 2021-10-05 DIAGNOSIS — J02.9 ACUTE PHARYNGITIS, UNSPECIFIED ETIOLOGY: Primary | ICD-10-CM

## 2021-10-05 LAB
DEPRECATED S PYO AG THROAT QL EIA: NEGATIVE
SARS-COV-2, XPLCVT: NOT DETECTED
SOURCE, COVRS: NORMAL

## 2021-10-05 PROCEDURE — 87147 CULTURE TYPE IMMUNOLOGIC: CPT

## 2021-10-05 PROCEDURE — 87070 CULTURE OTHR SPECIMN AEROBIC: CPT

## 2021-10-05 PROCEDURE — U0003 INFECTIOUS AGENT DETECTION BY NUCLEIC ACID (DNA OR RNA); SEVERE ACUTE RESPIRATORY SYNDROME CORONAVIRUS 2 (SARS-COV-2) (CORONAVIRUS DISEASE [COVID-19]), AMPLIFIED PROBE TECHNIQUE, MAKING USE OF HIGH THROUGHPUT TECHNOLOGIES AS DESCRIBED BY CMS-2020-01-R: HCPCS

## 2021-10-05 PROCEDURE — 87880 STREP A ASSAY W/OPTIC: CPT

## 2021-10-05 PROCEDURE — 99282 EMERGENCY DEPT VISIT SF MDM: CPT

## 2021-10-05 RX ORDER — AMOXICILLIN 875 MG/1
875 TABLET, FILM COATED ORAL 2 TIMES DAILY
Qty: 14 TABLET | Refills: 0 | Status: SHIPPED | OUTPATIENT
Start: 2021-10-05 | End: 2021-10-12

## 2021-10-05 RX ORDER — AMOXICILLIN 875 MG/1
875 TABLET, FILM COATED ORAL 2 TIMES DAILY
Qty: 20 TABLET | Refills: 0 | Status: SHIPPED | OUTPATIENT
Start: 2021-10-05 | End: 2021-10-05 | Stop reason: SDUPTHER

## 2021-10-05 NOTE — ED PROVIDER NOTES
EMERGENCY DEPARTMENT HISTORY AND PHYSICAL EXAM    Date: 10/5/2021  Patient Name: Joao Archibald    History of Presenting Illness     Chief Complaint   Patient presents with    Sore Throat         History Provided By: Patient    HPI: Joao Archibald is a 19 y.o. female with a PMH of No significant past medical history who presents with sore throat. Onset 2 days ago. Reports sx began after attending Sunday dinner. Reports white patches to the tonsil. Reports pain with eating. Denies exposure to COVID or strep throat. She has not received COVID vaccine. PCP: Hilda Moore MD    Current Outpatient Medications   Medication Sig Dispense Refill    amoxicillin (AMOXIL) 875 mg tablet Take 1 Tablet by mouth two (2) times a day for 7 days. 20 Tablet 0       Past History     Past Medical History:  Past Medical History:   Diagnosis Date    Other ill-defined conditions(799.89)     eczema, ADHD    Psychiatric disorder     Anxiety and Depression, not currently on any medications. Past Surgical History:  Past Surgical History:   Procedure Laterality Date    HX OTHER SURGICAL  2016    wisdom teeth        Family History:  No family history on file. Social History:  Social History     Tobacco Use    Smoking status: Former Smoker     Packs/day: 0.25     Years: 2.00     Pack years: 0.50     Quit date: 01/2018     Years since quitting: 3.7    Smokeless tobacco: Never Used   Substance Use Topics    Alcohol use: No    Drug use: No       Allergies: Allergies   Allergen Reactions    Tylenol [Acetaminophen] Hives     Pt states no allergy to tylenol         Review of Systems   Review of Systems   Constitutional: Negative for chills, fatigue and fever. HENT: Positive for sore throat. Negative for congestion, ear discharge, ear pain, postnasal drip, rhinorrhea, sinus pain and sneezing. Eyes: Negative for pain. Respiratory: Negative for cough. Cardiovascular: Negative for chest pain.    Gastrointestinal: Negative for abdominal pain, nausea and vomiting. Musculoskeletal: Negative for arthralgias and joint swelling. Skin: Negative for rash and wound. Neurological: Negative for dizziness, numbness and headaches. All other systems reviewed and are negative. Physical Exam     Vitals:    10/05/21 0854   BP: 116/60   Pulse: 67   Resp: 20   Temp: 98.2 °F (36.8 °C)   SpO2: 98%   Weight: 97.5 kg (215 lb)   Height: 5' 3\" (1.6 m)     Physical Exam  Vitals and nursing note reviewed. Constitutional:       General: She is not in acute distress. Appearance: She is well-developed. She is not ill-appearing. HENT:      Head: Normocephalic and atraumatic. Right Ear: Tympanic membrane and ear canal normal.      Left Ear: Tympanic membrane and ear canal normal.      Nose: Nose normal.      Mouth/Throat:      Mouth: Mucous membranes are moist.      Pharynx: Oropharyngeal exudate and posterior oropharyngeal erythema present. Eyes:      Extraocular Movements: Extraocular movements intact. Conjunctiva/sclera: Conjunctivae normal.      Pupils: Pupils are equal, round, and reactive to light. Cardiovascular:      Rate and Rhythm: Normal rate and regular rhythm. Pulses: Normal pulses. Heart sounds: Normal heart sounds. Pulmonary:      Effort: Pulmonary effort is normal.      Breath sounds: Normal breath sounds. Musculoskeletal:      Cervical back: Normal range of motion and neck supple. Skin:     General: Skin is warm and dry. Neurological:      Mental Status: She is alert and oriented to person, place, and time.            Diagnostic Study Results     Labs -     Recent Results (from the past 12 hour(s))   STREP AG SCREEN, GROUP A    Collection Time: 10/05/21  9:20 AM    Specimen: Swab; Throat   Result Value Ref Range    Group A Strep Ag ID Negative NEG         Radiologic Studies -   No orders to display     CT Results  (Last 48 hours)    None        CXR Results  (Last 48 hours)    None Medical Decision Making   I am the first provider for this patient. I reviewed the vital signs, available nursing notes, past medical history, past surgical history, family history and social history. Vital Signs-Reviewed the patient's vital signs. Records Reviewed: Nursing Notes and Old Medical Records    Provider Notes (Medical Decision Making):       ED COURSE:   Initial assessment performed. The patients presenting problems have been discussed, and they are in agreement with the care plan formulated and outlined with them. I have encouraged them to ask questions as they arise throughout their visit. DDX: Viral vs. Strep Pharyngitis, Tonsillitis, Seasonal Allergies,  Postnasal Drip,COVID          Disposition:  Discharge    DISCHARGE NOTE:         Care plan outlined and precautions discussed. Patient has no new complaints, changes, or physical findings. All of pt's questions and concerns were addressed. Patient was instructed and agrees to follow up with PCP, as well as to return to the ED upon further deterioration. Patient is ready to go home. Follow-up Information     Follow up With Specialties Details Why Contact Info    Alethea Felix MD Pediatric Medicine Call in 1 week As needed, If symptoms worsen [address] [place]  [phone]            Current Discharge Medication List      START taking these medications    Details   amoxicillin (AMOXIL) 875 mg tablet Take 1 Tablet by mouth two (2) times a day for 7 days. Qty: 20 Tablet, Refills: 0  Start date: 10/5/2021, End date: 10/12/2021             Procedures:  Procedures    Please note that this dictation was completed with Dragon, computer voice recognition software. Quite often unanticipated grammatical, syntax, homophones, and other interpretive errors are inadvertently transcribed by the computer software. Please disregard these errors.   Additionally, please excuse any errors that have escaped final proofreading. Diagnosis     Clinical Impression:   1. Acute pharyngitis, unspecified etiology    2.  Person under investigation for COVID-19

## 2021-10-05 NOTE — ED NOTES
Patient (s) was  given copy of dc instructions and 1 e script(s). Patient (s)  verbalized understanding of instructions and script (s). Patient given a current medication reconciliation form and verbalized understanding of their medications. Patient (s) verbalized understanding of the importance of discussing medications with  his or her physician or clinic they will be following up with. Patient alert and oriented and in no acute distress. Patient walked out of ED.

## 2021-10-05 NOTE — ED TRIAGE NOTES
Pt in with sore throat and chills x 2 days. Pt states she took a sinus relief medication at 3am this morning without relief. Pt unsure of fever, denies COVID exposure, has not been vaccinated.

## 2021-10-05 NOTE — DISCHARGE INSTRUCTIONS
It was a pleasure taking care of you at [facility] Emergency Department today. We know that when you come to [facility] [place], you are entrusting us with your health, comfort, and safety. Our physicians and nurses honor that trust, and we truly appreciate the opportunity to care for you and your loved ones. We also value our feedback. If you receive a survey about your Emergency Department experience today, please fill it out. We care about our patients' feedback, and we listen to what you have to say. Thank you!

## 2021-10-07 LAB
BACTERIA SPEC CULT: ABNORMAL
BACTERIA SPEC CULT: ABNORMAL
SERVICE CMNT-IMP: ABNORMAL

## 2022-03-19 PROBLEM — O26.893 ABDOMINAL PAIN DURING PREGNANCY, THIRD TRIMESTER: Status: ACTIVE | Noted: 2018-08-30

## 2022-03-19 PROBLEM — R10.9 ABDOMINAL PAIN DURING PREGNANCY, THIRD TRIMESTER: Status: ACTIVE | Noted: 2018-08-30

## 2023-05-13 ENCOUNTER — HOSPITAL ENCOUNTER (EMERGENCY)
Facility: HOSPITAL | Age: 23
Discharge: HOME | End: 2023-05-13
Attending: STUDENT IN AN ORGANIZED HEALTH CARE EDUCATION/TRAINING PROGRAM
Payer: MEDICAID

## 2023-05-13 ENCOUNTER — APPOINTMENT (OUTPATIENT)
Facility: HOSPITAL | Age: 23
End: 2023-05-13
Payer: MEDICAID

## 2023-05-13 VITALS
SYSTOLIC BLOOD PRESSURE: 128 MMHG | RESPIRATION RATE: 14 BRPM | HEART RATE: 88 BPM | TEMPERATURE: 98.4 F | WEIGHT: 228 LBS | DIASTOLIC BLOOD PRESSURE: 89 MMHG | BODY MASS INDEX: 40.39 KG/M2 | OXYGEN SATURATION: 100 %

## 2023-05-13 DIAGNOSIS — J20.9 ACUTE BRONCHITIS, UNSPECIFIED ORGANISM: Primary | ICD-10-CM

## 2023-05-13 LAB
FLUAV RNA SPEC QL NAA+PROBE: NOT DETECTED
FLUBV RNA SPEC QL NAA+PROBE: NOT DETECTED
SARS-COV-2 RNA RESP QL NAA+PROBE: NOT DETECTED

## 2023-05-13 PROCEDURE — 87636 SARSCOV2 & INF A&B AMP PRB: CPT

## 2023-05-13 PROCEDURE — 71045 X-RAY EXAM CHEST 1 VIEW: CPT

## 2023-05-13 PROCEDURE — 99284 EMERGENCY DEPT VISIT MOD MDM: CPT

## 2023-05-13 RX ORDER — PREDNISONE 10 MG/1
10 TABLET ORAL 2 TIMES DAILY
Qty: 10 TABLET | Refills: 0 | Status: SHIPPED | OUTPATIENT
Start: 2023-05-13 | End: 2023-05-18

## 2023-05-13 RX ORDER — ALBUTEROL SULFATE 90 UG/1
2 AEROSOL, METERED RESPIRATORY (INHALATION) EVERY 6 HOURS PRN
Qty: 18 G | Refills: 0 | Status: SHIPPED | OUTPATIENT
Start: 2023-05-13

## 2023-05-13 RX ORDER — GUAIFENESIN/DEXTROMETHORPHAN 100-10MG/5
10 SYRUP ORAL 3 TIMES DAILY PRN
Qty: 120 ML | Refills: 0 | Status: SHIPPED | OUTPATIENT
Start: 2023-05-13 | End: 2023-05-23

## 2023-05-13 ASSESSMENT — ENCOUNTER SYMPTOMS
ABDOMINAL PAIN: 0
SHORTNESS OF BREATH: 0
BACK PAIN: 0
COUGH: 1

## 2023-05-13 ASSESSMENT — PAIN - FUNCTIONAL ASSESSMENT: PAIN_FUNCTIONAL_ASSESSMENT: 0-10

## 2023-05-13 ASSESSMENT — PAIN DESCRIPTION - DESCRIPTORS: DESCRIPTORS: SHARP

## 2023-05-13 ASSESSMENT — PAIN DESCRIPTION - LOCATION: LOCATION: BACK;CHEST

## 2023-05-13 ASSESSMENT — PAIN SCALES - GENERAL: PAINLEVEL_OUTOF10: 6

## 2023-05-13 NOTE — ED NOTES
Patient (s)  given copy of dc instructions and 3 script(s). Patient (s)  verbalized understanding of instructions and script (s). Patient given a current medication reconciliation form and verbalized understanding of their medications. Patient (s) verbalized understanding of the importance of discussing medications with his or her physician or clinic they will be following up with. Patient alert and oriented and in no acute distress. Patient discharged home, patient offered wheelchair, patient declines wheelchair.          [address]  05/13/23 3181

## 2023-05-13 NOTE — ED PROVIDER NOTES
MEDICATIONS:     Medication List        START taking these medications      albuterol sulfate  (90 Base) MCG/ACT inhaler  Commonly known as: PROVENTIL;VENTOLIN;PROAIR  Inhale 2 puffs into the lungs every 6 hours as needed for Wheezing     guaiFENesin-dextromethorphan 100-10 MG/5ML syrup  Commonly known as: ROBITUSSIN DM  Take 10 mLs by mouth 3 times daily as needed for Cough     predniSONE 10 MG tablet  Commonly known as: DELTASONE  Take 1 tablet by mouth 2 times daily for 5 days               Where to Get Your Medications        These medications were sent to [facility] [address], [facility] 85 [phone] - F [phone]  [address] [place] [address],[address], [address] [place] [id number]      Phone: [phone]   albuterol sulfate  (90 Base) MCG/ACT inhaler  guaiFENesin-dextromethorphan 100-10 MG/5ML syrup  predniSONE 10 MG tablet           DISCONTINUED MEDICATIONS:  Discharge Medication List as of 5/13/2023 11:14 AM          I have seen and evaluated the patient. My supervision physician was available for consultation. I am the Primary Clinician of Record. ROGER Porter NP (electronically signed)    (Please note that parts of this dictation were completed with voice recognition software. Quite often unanticipated grammatical, syntax, homophones, and other interpretive errors are inadvertently transcribed by the computer software. Please disregards these errors.  Please excuse any errors that have escaped final proofreading.)        ROGER Del Cid NP  05/13/23 [address], APRN - NP  05/13/23 3821

## 2023-05-13 NOTE — ED NOTES
Patient here with c/o cough and wheezing. Patient states symptoms worsening over 3 days. Patient denies cough. Patient states she is a smoker. Patient denies contact with anyone with known illness. Emergency Department Nursing Plan of Care       The Nursing Plan of Care is developed from the Nursing assessment and Emergency Department Attending provider initial evaluation. The plan of care may be reviewed in the ED Provider note.       The Plan of Care was developed with the following considerations:  Patient / Family readiness to learn indicated by:verbalized understanding  Persons(s) to be included in education: patient  Barriers to Learning/Limitations:None      Signed     Landy Turner RN    5/13/2023   09:55 AM         Landy Turner RN  05/13/23 1017

## 2023-10-13 ENCOUNTER — APPOINTMENT (OUTPATIENT)
Facility: HOSPITAL | Age: 23
End: 2023-10-13
Payer: MEDICAID

## 2023-10-13 ENCOUNTER — HOSPITAL ENCOUNTER (EMERGENCY)
Facility: HOSPITAL | Age: 23
Discharge: HOME | End: 2023-10-13
Payer: MEDICAID

## 2023-10-13 VITALS
SYSTOLIC BLOOD PRESSURE: 109 MMHG | RESPIRATION RATE: 20 BRPM | OXYGEN SATURATION: 99 % | DIASTOLIC BLOOD PRESSURE: 89 MMHG | HEART RATE: 71 BPM | BODY MASS INDEX: 37.21 KG/M2 | HEIGHT: 63 IN | TEMPERATURE: 97.9 F | WEIGHT: 210 LBS

## 2023-10-13 DIAGNOSIS — M25.562 CHRONIC PAIN OF LEFT KNEE: ICD-10-CM

## 2023-10-13 DIAGNOSIS — M17.12 OSTEOARTHRITIS OF LEFT KNEE, UNSPECIFIED OSTEOARTHRITIS TYPE: Primary | ICD-10-CM

## 2023-10-13 DIAGNOSIS — G89.29 CHRONIC PAIN OF LEFT KNEE: ICD-10-CM

## 2023-10-13 PROCEDURE — 73562 X-RAY EXAM OF KNEE 3: CPT

## 2023-10-13 PROCEDURE — 96372 THER/PROPH/DIAG INJ SC/IM: CPT

## 2023-10-13 PROCEDURE — 99284 EMERGENCY DEPT VISIT MOD MDM: CPT

## 2023-10-13 PROCEDURE — 6360000002 HC RX W HCPCS: Performed by: PHYSICIAN ASSISTANT

## 2023-10-13 RX ORDER — KETOROLAC TROMETHAMINE 30 MG/ML
30 INJECTION, SOLUTION INTRAMUSCULAR; INTRAVENOUS ONCE
Status: COMPLETED | OUTPATIENT
Start: 2023-10-13 | End: 2023-10-13

## 2023-10-13 RX ORDER — DICLOFENAC SODIUM 75 MG/1
75 TABLET, DELAYED RELEASE ORAL 2 TIMES DAILY
Qty: 30 TABLET | Refills: 0 | Status: SHIPPED | OUTPATIENT
Start: 2023-10-13

## 2023-10-13 RX ORDER — LIDOCAINE 50 MG/G
1 PATCH TOPICAL DAILY PRN
Qty: 10 PATCH | Refills: 0 | Status: SHIPPED | OUTPATIENT
Start: 2023-10-13 | End: 2023-10-23

## 2023-10-13 RX ADMIN — KETOROLAC TROMETHAMINE 30 MG: 30 INJECTION, SOLUTION INTRAMUSCULAR; INTRAVENOUS at 17:54

## 2023-10-13 ASSESSMENT — PAIN DESCRIPTION - ORIENTATION: ORIENTATION: LEFT

## 2023-10-13 ASSESSMENT — PAIN SCALES - GENERAL
PAINLEVEL_OUTOF10: 8
PAINLEVEL_OUTOF10: 9

## 2023-10-13 ASSESSMENT — PAIN DESCRIPTION - LOCATION
LOCATION: KNEE
LOCATION: KNEE

## 2023-10-13 ASSESSMENT — PAIN - FUNCTIONAL ASSESSMENT: PAIN_FUNCTIONAL_ASSESSMENT: 0-10

## 2023-10-13 ASSESSMENT — PAIN DESCRIPTION - PAIN TYPE: TYPE: CHRONIC PAIN

## 2023-10-13 ASSESSMENT — PAIN DESCRIPTION - DESCRIPTORS: DESCRIPTORS: ACHING;SORE

## 2023-10-13 NOTE — ED PROVIDER NOTES
Osteoarthritis of left knee, unspecified osteoarthritis type    2. Chronic pain of left knee          DISPOSITION/PLAN   DISPOSITION Decision To Discharge 10/13/2023 06:24:37 PM        Care plan outlined and precautions discussed. Patient has no new complaints, changes, or physical findings. Results of xrays were reviewed with the patient. All medications were reviewed with the patient; will d/c home with Voltaren. All of pt's questions and concerns were addressed. Patient was instructed and agrees to follow up with Ortho, as well as to return to the ED upon further deterioration. Patient is ready to go home. PATIENT REFERRED TO:  [address] [place] [address]  [address] [place] [address],[address], [place]  [address]  100 [facility]           1000 [facility]  [address] [place] [id number] [phone]           DISCHARGE MEDICATIONS:     Medication List        START taking these medications      diclofenac 75 MG EC tablet  Commonly known as: VOLTAREN  Take 1 tablet by mouth 2 times daily     lidocaine 5 %  Commonly known as: LIDODERM  Place 1 patch onto the skin daily as needed for Pain 12 hours on, 12 hours off. ASK your doctor about these medications      albuterol sulfate  (90 Base) MCG/ACT inhaler  Commonly known as: PROVENTIL;VENTOLIN;PROAIR  Inhale 2 puffs into the lungs every 6 hours as needed for Wheezing               Where to Get Your Medications        These medications were sent to [facility]. [facility] [phone]  [address], [address] [place] [id number]      Phone: [phone]   diclofenac 75 MG EC tablet  lidocaine 5 %           DISCONTINUED MEDICATIONS:  Current Discharge Medication List          I have seen and evaluated the patient autonomously.  My supervision physician was on

## 2023-10-13 NOTE — ED NOTES
Patient (s)  given copy of dc instructions and 2 script(s). Patient (s)  verbalized understanding of instructions and script (s). Patient given a current medication reconciliation form and verbalized understanding of their medications. Patient (s) verbalized understanding of the importance of discussing medications with his or her physician or clinic they will be following up with. Patient alert and oriented and in no acute distress. Patient discharged home, patient offered wheelchair, patient declines wheelchair.          Niecy Oneal  10/13/23 0117

## 2023-10-16 ENCOUNTER — TELEPHONE (OUTPATIENT)
Age: 23
End: 2023-10-16

## 2024-04-26 NOTE — PROGRESS NOTES
Labor Progress Note Patient seen, fetal heart rate and contraction pattern evaluated, patient examined. Pt now using nitrous for pain mgmt. Patient Vitals for the past 2 hrs: 
 BP Pulse Resp  
08/30/18 1453 130/88 85 14  
08/30/18 1430 - - 14  
08/30/18 1400 - - 14 Physical Exam: 
Cervical Exam:  6/90 %/-1 Uterine Activity: Frequency: Every 2 minutes and Intensity: strong Fetal Heart Rate: 130, mod variability, variable contractions Category 2 tracing SROM occurred during her exam- light meconium stained fluid Assessment/Plan: 
Reassuring fetal status, Labor  Progressing normally Continue expectant management, Continue plan for vaginal delivery. Pitocin turned down by 2 units after SROM.   
 
Bowen Rodas DO 
 
 4 = No assist / stand by assistance

## 2024-06-13 ENCOUNTER — HOSPITAL ENCOUNTER (EMERGENCY)
Facility: HOSPITAL | Age: 24
Discharge: HOME | End: 2024-06-13
Payer: MEDICAID

## 2024-06-13 VITALS
DIASTOLIC BLOOD PRESSURE: 65 MMHG | HEIGHT: 63 IN | OXYGEN SATURATION: 100 % | SYSTOLIC BLOOD PRESSURE: 120 MMHG | HEART RATE: 64 BPM | RESPIRATION RATE: 18 BRPM | TEMPERATURE: 98.5 F | WEIGHT: 239 LBS | BODY MASS INDEX: 42.35 KG/M2

## 2024-06-13 DIAGNOSIS — B35.3 TINEA PEDIS OF RIGHT FOOT: Primary | ICD-10-CM

## 2024-06-13 PROCEDURE — 99283 EMERGENCY DEPT VISIT LOW MDM: CPT

## 2024-06-13 ASSESSMENT — PAIN SCALES - GENERAL: PAINLEVEL_OUTOF10: 7

## 2024-06-13 ASSESSMENT — PAIN - FUNCTIONAL ASSESSMENT: PAIN_FUNCTIONAL_ASSESSMENT: 0-10

## 2024-06-13 ASSESSMENT — PAIN DESCRIPTION - LOCATION: LOCATION: TOE (COMMENT WHICH ONE)

## 2024-06-13 ASSESSMENT — PAIN DESCRIPTION - DESCRIPTORS: DESCRIPTORS: ACHING;SORE

## 2024-06-13 ASSESSMENT — PAIN DESCRIPTION - PAIN TYPE: TYPE: ACUTE PAIN

## 2024-06-13 ASSESSMENT — PAIN DESCRIPTION - ORIENTATION: ORIENTATION: RIGHT

## 2024-06-13 NOTE — ED PROVIDER NOTES
[facility] EMERGENCY DEPT  EMERGENCY DEPARTMENT ENCOUNTER         Pt Name: Venkatesh Veras  MRN: [id number]  Birthdate 2000  Date of evaluation: 2024  Provider: Miriam Villarreal PA-C   PCP: Anabella Marcelino MD  Note Started: 2:05 PM EDT 24     CHIEF COMPLAINT       Chief Complaint   Patient presents with    Toe Pain        HISTORY OF PRESENT ILLNESS: 1 or more elements      History From: Patient  HPI Limitations: None     Venkatesh Veras is a 23 y.o. female who presents with painful rash between her fourth and fifth toes of her right foot.     Nursing Notes were all reviewed and agreed with or any disagreements were addressed in the HPI.  Please see MDM for additional details of HPI and ROS     REVIEW OF SYSTEMS      Review of Systems   Skin:  Positive for rash.   All other systems reviewed and are negative.       Positives and Pertinent negatives as per HPI.    PAST HISTORY     Past Medical History:  Past Medical History:   Diagnosis Date    Other ill-defined conditions(789.89)     eczema, ADHD    Psychiatric disorder     Anxiety and Depression, not currently on any medications.        Past Surgical History:  Past Surgical History:   Procedure Laterality Date    OTHER SURGICAL HISTORY  2016    wisdom teeth        Family History:  No family history on file.    Social History:  Social History     Tobacco Use    Smoking status: Former     Current packs/day: 0.00     Types: Cigarettes     Quit date: 2018     Years since quittin.4    Smokeless tobacco: Never   Substance Use Topics    Alcohol use: No    Drug use: No       Allergies:  Allergies   Allergen Reactions    Acetaminophen Hives     Pt states no allergy to tylenol       CURRENT MEDICATIONS      Previous Medications    ALBUTEROL SULFATE HFA (PROVENTIL;VENTOLIN;PROAIR) 108 (90 BASE) MCG/ACT INHALER    Inhale 2 puffs into the lungs every 6 hours as needed for Wheezing    DICLOFENAC (VOLTAREN) 75 MG EC TABLET    Take 1 tablet by mouth 2 times daily

## 2024-06-13 NOTE — ED TRIAGE NOTES
Pt presents ambulatory to the ED c/o pain between the 4th and 5th digits of the right foot. Pt reports itching started \"a long time ago\" but reports pain started 2 days PTA.     Pt has sores noted between multiple toes on the right foot. The toes of the left foot appear very dry and flaking. No bleeding or drainage present. Pt has hx of eczema.

## 2025-06-16 ENCOUNTER — HOSPITAL ENCOUNTER (EMERGENCY)
Facility: HOSPITAL | Age: 25
Discharge: HOME | End: 2025-06-16
Payer: MEDICAID

## 2025-06-16 ENCOUNTER — APPOINTMENT (OUTPATIENT)
Facility: HOSPITAL | Age: 25
End: 2025-06-16
Payer: MEDICAID

## 2025-06-16 VITALS
OXYGEN SATURATION: 100 % | RESPIRATION RATE: 20 BRPM | TEMPERATURE: 98.1 F | WEIGHT: 247.8 LBS | BODY MASS INDEX: 43.9 KG/M2 | SYSTOLIC BLOOD PRESSURE: 112 MMHG | DIASTOLIC BLOOD PRESSURE: 62 MMHG | HEART RATE: 56 BPM

## 2025-06-16 DIAGNOSIS — R07.82 INTERCOSTAL PAIN: Primary | ICD-10-CM

## 2025-06-16 DIAGNOSIS — R07.89 CHEST WALL PAIN: ICD-10-CM

## 2025-06-16 PROCEDURE — 93005 ELECTROCARDIOGRAM TRACING: CPT | Performed by: EMERGENCY MEDICINE

## 2025-06-16 PROCEDURE — 6370000000 HC RX 637 (ALT 250 FOR IP): Performed by: PHYSICIAN ASSISTANT

## 2025-06-16 PROCEDURE — 99284 EMERGENCY DEPT VISIT MOD MDM: CPT

## 2025-06-16 PROCEDURE — 71046 X-RAY EXAM CHEST 2 VIEWS: CPT

## 2025-06-16 RX ORDER — IBUPROFEN 800 MG/1
800 TABLET, FILM COATED ORAL EVERY 8 HOURS PRN
Qty: 20 TABLET | Refills: 0 | Status: SHIPPED | OUTPATIENT
Start: 2025-06-16

## 2025-06-16 RX ORDER — IBUPROFEN 400 MG/1
800 TABLET, FILM COATED ORAL
Status: COMPLETED | OUTPATIENT
Start: 2025-06-16 | End: 2025-06-16

## 2025-06-16 RX ADMIN — IBUPROFEN 800 MG: 400 TABLET ORAL at 16:27

## 2025-06-16 ASSESSMENT — PAIN - FUNCTIONAL ASSESSMENT: PAIN_FUNCTIONAL_ASSESSMENT: 0-10

## 2025-06-16 ASSESSMENT — PAIN DESCRIPTION - PAIN TYPE: TYPE: ACUTE PAIN

## 2025-06-16 ASSESSMENT — ENCOUNTER SYMPTOMS
DIARRHEA: 0
VOMITING: 0
CHEST TIGHTNESS: 0
ABDOMINAL PAIN: 0
EYE PAIN: 0
WHEEZING: 0
COUGH: 0
RHINORRHEA: 0
BACK PAIN: 0
NAUSEA: 0
SORE THROAT: 0
SHORTNESS OF BREATH: 1

## 2025-06-16 ASSESSMENT — PAIN DESCRIPTION - DESCRIPTORS: DESCRIPTORS: ACHING

## 2025-06-16 ASSESSMENT — PAIN DESCRIPTION - ORIENTATION: ORIENTATION: RIGHT

## 2025-06-16 ASSESSMENT — PAIN DESCRIPTION - LOCATION: LOCATION: CHEST

## 2025-06-16 ASSESSMENT — PAIN SCALES - GENERAL: PAINLEVEL_OUTOF10: 7

## 2025-06-16 NOTE — ED PROVIDER NOTES
Atrial Rate 53 BPM    P-R Interval 160 ms    QRS Duration 92 ms    Q-T Interval 402 ms    QTc Calculation (Bazett) 377 ms    P Axis 31 degrees    R Axis 55 degrees    T Axis 35 degrees    Diagnosis       Sinus bradycardia with sinus arrhythmia  Otherwise normal ECG  When compared with ECG of 04-JAN-2018 22:09,  No significant change was found          EKG: When ordered, EKG's are interpreted by the Emergency Department Physician in the absence of a cardiologist.  Please see their note for interpretation of EKG.      RADIOLOGY:  Non-plain film images such as CT, Ultrasound and MRI are read by the radiologist. Plain radiographic images are visualized and preliminarily interpreted by the ED Provider with the below findings:     No acute process on chest x-ray is independently interpreted by myself.     Interpretation per the Radiologist below, if available at the time of this note:     XR CHEST (2 VW)   Final Result   1. No acute cardiopulmonary disease            Electronically signed by Aleksey Baez            PROCEDURES   Unless otherwise noted below, none  Procedures     CRITICAL CARE TIME   none    EMERGENCY DEPARTMENT COURSE and DIFFERENTIAL DIAGNOSIS/MDM   Initial assessment performed. The patients presenting problems have been discussed, and they are in agreement with the care plan formulated and outlined with them.  I have encouraged them to ask questions as they arise throughout their visit.    CC/HPI Summary, DDx, ED Course, and Reassessment: Well-appearing afebrile and hemodynamically stable 24-year-old female with medical history significant for anxiety, depression, eczema, ADHD, tobacco abuse presents in no apparent distress with acute moderate aching as well as intermittently sharp right-sided chest pain that radiates from her right shoulder down and across her chest that started 2 to 3 days ago.  Endorses shortness of breath but states that she thinks that she is \"just fat\".  Denies any new or

## 2025-06-18 LAB
EKG ATRIAL RATE: 53 BPM
EKG DIAGNOSIS: NORMAL
EKG P AXIS: 31 DEGREES
EKG P-R INTERVAL: 160 MS
EKG Q-T INTERVAL: 402 MS
EKG QRS DURATION: 92 MS
EKG QTC CALCULATION (BAZETT): 377 MS
EKG R AXIS: 55 DEGREES
EKG T AXIS: 35 DEGREES
EKG VENTRICULAR RATE: 53 BPM

## (undated) DEVICE — SOLUTION IRRIG 1000ML H2O STRL BLT

## (undated) DEVICE — MEDI-VAC NON-CONDUCTIVE SUCTION TUBING: Brand: CARDINAL HEALTH

## (undated) DEVICE — SOLUTION IV 1000ML 0.9% SOD CHL

## (undated) DEVICE — 3000CC GUARDIAN II: Brand: GUARDIAN

## (undated) DEVICE — STERILE POLYISOPRENE POWDER-FREE SURGICAL GLOVES: Brand: PROTEXIS

## (undated) DEVICE — SUTURE VCRL SZ 2-0 L36IN ABSRB VLT L36MM CT-1 1/2 CIR J345H

## (undated) DEVICE — DEVON™ KNEE AND BODY STRAP 60" X 3" (1.5 M X 7.6 CM): Brand: DEVON

## (undated) DEVICE — Device: Brand: PORTEX

## (undated) DEVICE — SUTURE MCRYL SZ 3-0 L27IN ABSRB UD L60MM KS STR REV CUT Y523H

## (undated) DEVICE — POOLE SUCTION INSTRUMENT WITH REMOVABLE SHEATH: Brand: POOLE

## (undated) DEVICE — SUT CHRMC 1 36IN CTX BRN --

## (undated) DEVICE — KENDALL SCD EXPRESS SLEEVES, KNEE LENGTH, MEDIUM: Brand: KENDALL SCD

## (undated) DEVICE — SOLIDIFIER MEDC 1200ML -- CONVERT TO 356117

## (undated) DEVICE — REM POLYHESIVE ADULT PATIENT RETURN ELECTRODE: Brand: VALLEYLAB

## (undated) DEVICE — PACK PROCEDURE SURG C SECT KT SMH

## (undated) DEVICE — CATH FOLEY 16F LUBRI-SIL IC --

## (undated) DEVICE — LIGHT HANDLE: Brand: DEVON

## (undated) DEVICE — ROYAL SILK SURGICAL GOWN, XXL: Brand: CONVERTORS

## (undated) DEVICE — SUTURE VCRL SZ 0 L36IN ABSRB VLT L40MM CT 1/2 CIR J358H

## (undated) DEVICE — (D)PREP SKN CHLRAPRP APPL 26ML -- CONVERT TO ITEM 371833

## (undated) DEVICE — COVERALL PREM SMS 2XL KNIT --

## (undated) DEVICE — DRAPE FLD WRM W44XL66IN C6L FOR INTRATEMP SYS THERMABASIN